# Patient Record
Sex: MALE | Race: WHITE | URBAN - METROPOLITAN AREA
[De-identification: names, ages, dates, MRNs, and addresses within clinical notes are randomized per-mention and may not be internally consistent; named-entity substitution may affect disease eponyms.]

---

## 2017-07-13 ENCOUNTER — OFFICE VISIT (OUTPATIENT)
Dept: FAMILY MEDICINE | Facility: CLINIC | Age: 57
End: 2017-07-13
Payer: MEDICARE

## 2017-07-13 VITALS
HEART RATE: 60 BPM | DIASTOLIC BLOOD PRESSURE: 64 MMHG | SYSTOLIC BLOOD PRESSURE: 110 MMHG | TEMPERATURE: 97.3 F | RESPIRATION RATE: 20 BRPM | WEIGHT: 281.6 LBS

## 2017-07-13 DIAGNOSIS — Z12.11 SPECIAL SCREENING FOR MALIGNANT NEOPLASMS, COLON: Primary | ICD-10-CM

## 2017-07-13 DIAGNOSIS — M17.32 POST-TRAUMATIC OSTEOARTHRITIS OF LEFT KNEE: ICD-10-CM

## 2017-07-13 PROCEDURE — 20610 DRAIN/INJ JOINT/BURSA W/O US: CPT | Mod: LT | Performed by: PHYSICIAN ASSISTANT

## 2017-07-13 RX ORDER — ATENOLOL 50 MG/1
TABLET ORAL
Refills: 3 | COMMUNITY
Start: 2017-04-01

## 2017-07-13 RX ORDER — IBUPROFEN 800 MG/1
TABLET, FILM COATED ORAL
Refills: 0 | COMMUNITY
Start: 2016-10-01

## 2017-07-13 RX ORDER — AMITRIPTYLINE HYDROCHLORIDE 10 MG/1
10 TABLET ORAL
Refills: 5 | COMMUNITY
Start: 2017-02-28

## 2017-07-13 RX ORDER — TRIAMCINOLONE ACETONIDE 40 MG/ML
40 INJECTION, SUSPENSION INTRA-ARTICULAR; INTRAMUSCULAR ONCE
Qty: 1 ML | Refills: 0 | COMMUNITY
Start: 2017-07-13

## 2017-07-13 RX ORDER — HYDROCODONE BITARTRATE AND ACETAMINOPHEN 10; 325 MG/1; MG/1
TABLET ORAL
Refills: 0 | COMMUNITY
Start: 2017-06-29

## 2017-07-13 RX ORDER — KETOCONAZOLE 20 MG/G
CREAM TOPICAL
Refills: 0 | COMMUNITY
Start: 2016-10-18

## 2017-07-13 ASSESSMENT — ENCOUNTER SYMPTOMS
DOUBLE VISION: 0
BACK PAIN: 0
PALPITATIONS: 0
VOMITING: 0
MYALGIAS: 0
CONSTIPATION: 0
HALLUCINATIONS: 0
NEUROLOGICAL NEGATIVE: 1
SORE THROAT: 0
ABDOMINAL PAIN: 0
BLURRED VISION: 0
HEARTBURN: 0
NERVOUS/ANXIOUS: 0
DYSURIA: 0
TINGLING: 0
COUGH: 0
DIAPHORESIS: 0
EYE PAIN: 0
HEMOPTYSIS: 0
EYE REDNESS: 0
BLOOD IN STOOL: 0
NAUSEA: 0
INSOMNIA: 0
FOCAL WEAKNESS: 0
FREQUENCY: 0
PHOTOPHOBIA: 0
FEVER: 0
LOSS OF CONSCIOUSNESS: 0
SHORTNESS OF BREATH: 0
DEPRESSION: 0
EYE DISCHARGE: 0
SPUTUM PRODUCTION: 0
ORTHOPNEA: 0
SEIZURES: 0
NECK PAIN: 0
WEIGHT LOSS: 0
WHEEZING: 0
SENSORY CHANGE: 0
HEADACHES: 0
DIARRHEA: 0
WEAKNESS: 0
DIZZINESS: 0

## 2017-07-13 ASSESSMENT — LIFESTYLE VARIABLES: SUBSTANCE_ABUSE: 0

## 2017-07-13 ASSESSMENT — PAIN SCALES - GENERAL: PAINLEVEL: WORST PAIN (10)

## 2017-07-13 NOTE — MR AVS SNAPSHOT
After Visit Summary   7/13/2017    Vic Sutherland    MRN: [id number]           Patient Information     Date Of Birth          1960        Visit Information        Provider Department      7/13/2017 2:40 PM Kane Ewing PA-C [facility]        Today's Diagnoses     Special screening for malignant neoplasms, colon    -  1    Post-traumatic osteoarthritis of left knee           Follow-ups after your visit        Additional Services     GASTROENTEROLOGY ADULT REF PROCEDURE ONLY       Last Lab Result: No results found for: CR  There is no height or weight on file to calculate BMI.     Needed:  No  Language:  English    Patient will be contacted to schedule procedure.     Please be aware that coverage of these services is subject to the terms and limitations of your health insurance plan.  Call member services at your health plan with any benefit or coverage questions.  Any procedures must be performed at a [place] facility OR coordinated by your clinic's referral office.    Please bring the following with you to your appointment:    (1) Any X-Rays, CTs or MRIs which have been performed.  Contact the facility where they were done to arrange for  prior to your scheduled appointment.    (2) List of current medications   (3) This referral request   (4) Any documents/labs given to you for this referral                  Follow-up notes from your care team     Return if symptoms worsen or fail to improve.      Who to contact     If you have questions or need follow up information about today's clinic visit or your schedule please contact [facility] directly at [phone].  Normal or non-critical lab and imaging results will be communicated to you by MyChart, letter or phone within 4 business days after the clinic has received the results. If you do not hear from us within 7 days, please contact the clinic through MyChart or phone. If you have a  "critical or abnormal lab result, we will notify you by phone as soon as possible.  Submit refill requests through [employer] or call your pharmacy and they will forward the refill request to us. Please allow 3 business days for your refill to be completed.          Additional Information About Your Visit        [employer] Information     LearnUpon lets you send messages to your doctor, view your test results, renew your prescriptions, schedule appointments and more. To sign up, go to [email or web address] . Click on \"Log in\" on the left side of the screen, which will take you to the Welcome page. Then click on \"Sign up Now\" on the right side of the page.     You will be asked to enter the access code listed below, as well as some personal information. Please follow the directions to create your username and password.     Your access code is: [email or web address]  Expires: 10/11/2017  4:06 PM     Your access code will  in 90 days. If you need help or a new code, please call your [place] clinic or [phone].        Care EveryWhere ID     This is your Care EveryWhere ID. This could be used by other organizations to access your [place] medical records  [id number]        Your Vitals Were     Pulse Temperature Respirations             60 97.3  F (36.3  C) (Tympanic) 20          Blood Pressure from Last 3 Encounters:   17 110/64   16 119/76    Weight from Last 3 Encounters:   17 281 lb 9.6 oz (127.7 kg)   16 283 lb 3.2 oz (128.5 kg)              We Performed the Following     DRAIN/INJECT LARGE JOINT/BURSA     GASTROENTEROLOGY ADULT REF PROCEDURE ONLY     KENALOG PER 10 MG        Primary Care Provider    None Specified       No primary provider on file.        Equal Access to Services     [facility] DANNI : Morales Gunter, jeannie knutson, bro branch. So [facility] [phone].    ATENCIÓN: Si habla español, tiene a baker disposición " servicios gratuitos de asistencia lingüística. Richa mcdonald [phone].    We comply with applicable federal civil rights laws and [place] laws. We do not discriminate on the basis of race, color, national origin, age, disability sex, sexual orientation or gender identity.            Thank you!     Thank you for choosing [facility]  for your care. Our goal is always to provide you with excellent care. Hearing back from our patients is one way we can continue to improve our services. Please take a few minutes to complete the written survey that you may receive in the mail after your visit with us. Thank you!             Your Updated Medication List - Protect others around you: Learn how to safely use, store and throw away your medicines at www.disposemymeds.org.          This list is accurate as of: 7/13/17  4:06 PM.  Always use your most recent med list.                   Brand Name Dispense Instructions for use Diagnosis    amitriptyline 10 MG tablet    ELAVIL     Take 10 mg by mouth        amLODIPine 10 MG tablet    NORVASC     TK 1 T PO QD        ASPIRIN PO      Take 81 mg by mouth        atenolol 50 MG tablet    TENORMIN     TK 1 T PO QD        GLEEVEC PO      Take by mouth daily        HYDROcodone-acetaminophen  MG per tablet    NORCO          ibuprofen 800 MG tablet    ADVIL/MOTRIN     TK 1 T PO TID WF        ketoconazole 2 % cream    NIZORAL          POTASSIUM PO           simvastatin 40 MG tablet    ZOCOR     TK 1 T PO QPM        triamcinolone acetonide 40 MG/ML injection    KENALOG-40    1 mL    1 mL (40 mg) by INTRA-ARTICULAR route once for 1 dose

## 2017-07-13 NOTE — PROGRESS NOTES
HPI    SUBJECTIVE:                                                    Vic Sutherland is a 56 year old male who presents to clinic today for a long history of knee pain and is requesting an injection. He has had injections in the past and it's been just over a year since his last injection.    Musculoskeletal problem/pain      Duration: Couple months     Description  Location: Left knee     Intensity:  severe    Accompanying signs and symptoms: Possible swelling     History  Previous similar problem: no   Previous evaluation:  none    Precipitating or alleviating factors:  Trauma or overuse: YES  Aggravating factors include: walking    Therapies tried and outcome: nothing    Problem list and histories reviewed & adjusted, as indicated.  Additional history: as documented    There is no problem list on file for this patient.    History reviewed. No pertinent surgical history.    Social History   Substance Use Topics     Smoking status: Former Smoker     Types: Cigarettes     Smokeless tobacco: Not on file     Alcohol use No     History reviewed. No pertinent family history.      Current Outpatient Prescriptions   Medication Sig Dispense Refill     amitriptyline (ELAVIL) 10 MG tablet Take 10 mg by mouth  5     atenolol (TENORMIN) 50 MG tablet TK 1 T PO QD  3     HYDROcodone-acetaminophen (NORCO)  MG per tablet   0     ibuprofen (ADVIL/MOTRIN) 800 MG tablet TK 1 T PO TID WF  0     ketoconazole (NIZORAL) 2 % cream   0     POTASSIUM PO        ASPIRIN PO Take 81 mg by mouth       triamcinolone acetonide (KENALOG-40) 40 MG/ML injection 1 mL (40 mg) by INTRA-ARTICULAR route once for 1 dose 1 mL 0     Imatinib Mesylate (GLEEVEC PO) Take by mouth daily       amLODIPine (NORVASC) 10 MG tablet TK 1 T PO QD  3     simvastatin (ZOCOR) 40 MG tablet TK 1 T PO QPM  1     No Known Allergies  Labs reviewed in EPIC    Reviewed and updated as needed this visit by clinical staff       Reviewed and updated as needed this visit by  Provider       Review of Systems   Constitutional: Negative for diaphoresis, fever, malaise/fatigue and weight loss.   HENT: Negative for congestion, ear discharge, ear pain, hearing loss, nosebleeds and sore throat.    Eyes: Negative for blurred vision, double vision, photophobia, pain, discharge and redness.   Respiratory: Negative for cough, hemoptysis, sputum production, shortness of breath and wheezing.    Cardiovascular: Negative for chest pain, palpitations, orthopnea and leg swelling.   Gastrointestinal: Negative for abdominal pain, blood in stool, constipation, diarrhea, heartburn, melena, nausea and vomiting.   Genitourinary: Negative.  Negative for dysuria, frequency and urgency.   Musculoskeletal: Positive for joint pain. Negative for back pain, myalgias and neck pain.   Skin: Negative for itching and rash.   Neurological: Negative.  Negative for dizziness, tingling, sensory change, focal weakness, seizures, loss of consciousness, weakness and headaches.   Endo/Heme/Allergies: Negative.    Psychiatric/Behavioral: Negative for depression, hallucinations, substance abuse and suicidal ideas. The patient is not nervous/anxious and does not have insomnia.          Physical Exam   Constitutional: He is oriented to person, place, and time and well-developed, well-nourished, and in no distress.   HENT:   Head: Normocephalic and atraumatic.   Right Ear: External ear normal.   Left Ear: External ear normal.   Nose: Nose normal.   Mouth/Throat: Oropharynx is clear and moist.   Eyes: Conjunctivae and EOM are normal. Pupils are equal, round, and reactive to light. Right eye exhibits no discharge. Left eye exhibits no discharge. No scleral icterus.   Neck: Normal range of motion. Neck supple. No thyromegaly present.   Cardiovascular: Normal rate, regular rhythm, normal heart sounds and intact distal pulses.  Exam reveals no gallop and no friction rub.    No murmur heard.  Pulmonary/Chest: Effort normal and breath  sounds normal. No respiratory distress. He has no wheezes. He has no rales. He exhibits no tenderness.   Abdominal: Soft. Bowel sounds are normal. He exhibits no distension and no mass. There is no tenderness. There is no rebound and no guarding.   Musculoskeletal: He exhibits no edema.        Left knee: He exhibits decreased range of motion. Tenderness found. Medial joint line tenderness noted.   Lymphadenopathy:     He has no cervical adenopathy.   Neurological: He is alert and oriented to person, place, and time. He has normal reflexes. No cranial nerve deficit. He exhibits normal muscle tone. Gait normal. Coordination normal.   Skin: Skin is warm and dry. No rash noted. No erythema.   Psychiatric: Mood, memory, affect and judgment normal.       (Z12.11) Special screening for malignant neoplasms, colon  (primary encounter diagnosis)  Comment:   Plan: GASTROENTEROLOGY ADULT REF PROCEDURE ONLY            (M17.32) Post-traumatic osteoarthritis of left knee  Comment:   Plan: DRAIN/INJECT LARGE JOINT/BURSA, KENALOG PER 10         MG          Procedure note: After prep with rubbing alcohol ablation of the milligrams of triamcinolone combined with 7 cc of lidocaine were instilled in the left knee using a medial patellar approach.      Follow-up as needed.

## 2017-07-13 NOTE — NURSING NOTE
Chief Complaint   Patient presents with     Knee Pain       Initial /64 (BP Location: Right arm, Patient Position: Chair, Cuff Size: Adult Large)  Pulse 60  Temp 97.3  F (36.3  C) (Tympanic)  Resp 20  Wt 281 lb 9.6 oz (127.7 kg) There is no height or weight on file to calculate BMI.  Medication Reconciliation: complete    Health Maintenance that is potentially due pending provider review:  Colonoscopy/FIT    Gave pt phone number/pended order to schedule mammo and/or colonoscopy(or FIT)

## 2025-04-09 ENCOUNTER — LAB (OUTPATIENT)
Dept: LAB | Facility: CLINIC | Age: 65
End: 2025-04-09
Payer: MEDICARE

## 2025-04-09 DIAGNOSIS — M25.559 PAIN IN HIP JOINT: Primary | ICD-10-CM

## 2025-04-09 LAB
CRP SERPL-MCNC: 9.69 MG/L
ERYTHROCYTE [SEDIMENTATION RATE] IN BLOOD BY WESTERGREN METHOD: 14 MM/HR (ref 0–20)
Lab: NORMAL
PERFORMING LABORATORY: NORMAL
SPECIMEN STATUS: NORMAL
TEST NAME: NORMAL

## 2025-04-14 ENCOUNTER — TELEPHONE (OUTPATIENT)
Dept: RHEUMATOLOGY | Facility: CLINIC | Age: 65
End: 2025-04-14
Payer: MEDICARE

## 2025-04-14 ENCOUNTER — TELEPHONE (OUTPATIENT)
Dept: FAMILY MEDICINE | Facility: CLINIC | Age: 65
End: 2025-04-14
Payer: MEDICARE

## 2025-04-14 NOTE — TELEPHONE ENCOUNTER
[facility] lab called.  Results should have went to Dr Edilson Porter with [facility] as he is the ordering provider, not Dr Edilson Porter with  rheumatology.     Shirley Schmidt RN

## 2025-04-14 NOTE — TELEPHONE ENCOUNTER
----- Message from Edilson Porter sent at 4/12/2025 11:25 AM CDT -----  I do not think that this is my patient; are you able to forward laboratory results to ordering provider please?  ----- Message -----  From: Lab, Background User  Sent: 4/9/2025  11:00 AM CDT  To: Edilson Porter MD

## 2025-04-14 NOTE — TELEPHONE ENCOUNTER
General Call      Reason for Call:  [facility] calling because recent labwork results were sent to their provider Edilson Porter. However, the orders are from Edilson Porter from [facility]. Spoke to lab they are unable to correct it but we will fax the results to [facility] at number listed on order. [phone]. Faxed via right fax.     Catalina Peoples on 4/14/2025 at 2:34 PM  [phone]

## 2025-04-21 RX ORDER — WARFARIN SODIUM 5 MG/1
TABLET ORAL EVERY EVENING
COMMUNITY
Start: 2025-01-09

## 2025-04-21 RX ORDER — CYCLOBENZAPRINE HCL 10 MG
10 TABLET ORAL 3 TIMES DAILY PRN
Status: ON HOLD | COMMUNITY
End: 2025-04-29

## 2025-04-28 ENCOUNTER — ANESTHESIA EVENT (OUTPATIENT)
Dept: SURGERY | Facility: CLINIC | Age: 65
End: 2025-04-28
Payer: MEDICARE

## 2025-04-28 ASSESSMENT — ENCOUNTER SYMPTOMS: DYSRHYTHMIAS: 1

## 2025-04-28 NOTE — ANESTHESIA PREPROCEDURE EVALUATION
"Anesthesia Pre-Procedure Evaluation    Patient: Vic Sutherland   MRN: [id number] : 1960        Procedure : Procedure(s):  Conversion hip resurfacing to total hip arthroplasty          No past medical history on file.   No past surgical history on file.   No Known Allergies   Social History     Tobacco Use    Smoking status: Former     Types: Cigarettes    Smokeless tobacco: Not on file   Substance Use Topics    Alcohol use: No      Wt Readings from Last 1 Encounters:   17 127.7 kg (281 lb 9.6 oz)        Anesthesia Evaluation   Pt has had prior anesthetic. Type: MAC and General.        ROS/MED HX  ENT/Pulmonary:     (+)                tobacco use, Past use,                       Neurologic:       Cardiovascular:     (+) Dyslipidemia hypertension- -   -  - -   Taking blood thinners                     dysrhythmias, a-fib,             METS/Exercise Tolerance:     Hematologic:       Musculoskeletal:       GI/Hepatic:       Renal/Genitourinary:       Endo:     (+)               Obesity,       Psychiatric/Substance Use:       Infectious Disease:       Malignancy:       Other:            Physical Exam    Airway        Mallampati: II   TM distance: > 3 FB   Neck ROM: limited   Mouth opening: > 3 cm    Respiratory Devices and Support         Dental       (+) Minor Abnormalities - some fillings, tiny chips      Cardiovascular   cardiovascular exam normal          Pulmonary   pulmonary exam normal                OUTSIDE LABS:  CBC: No results found for: \"WBC\", \"HGB\", \"HCT\", \"PLT\"  BMP: No results found for: \"NA\", \"POTASSIUM\", \"CHLORIDE\", \"CO2\", \"BUN\", \"CR\", \"GLC\"  COAGS: No results found for: \"PTT\", \"INR\", \"FIBR\"  POC: No results found for: \"BGM\", \"HCG\", \"HCGS\"  HEPATIC: No results found for: \"ALBUMIN\", \"PROTTOTAL\", \"ALT\", \"AST\", \"GGT\", \"ALKPHOS\", \"BILITOTAL\", \"BILIDIRECT\", \"DAVID\"  OTHER:   Lab Results   Component Value Date    SED 14 2025       Anesthesia Plan    ASA Status:  3    NPO Status:  NPO " Appropriate    Anesthesia Type: Spinal.   Induction: Intravenous, Propofol.   Maintenance: Balanced.        Consents    Anesthesia Plan(s) and associated risks, benefits, and realistic alternatives discussed. Questions answered and patient/representative(s) expressed understanding.     - Discussed: Risks, Benefits and Alternatives for BOTH SEDATION and the PROCEDURE were discussed     - Discussed with:  Patient            Postoperative Care    Pain management: Multi-modal analgesia.   PONV prophylaxis: Ondansetron (or other 5HT-3), Dexamethasone or Solumedrol, Background Propofol Infusion     Comments:               BERNY Del Rosario CRNA    Clinically Significant Risk Factors Present on Admission                # Drug Induced Coagulation Defect: home medication list includes an anticoagulant medication

## 2025-04-29 ENCOUNTER — HOSPITAL ENCOUNTER (INPATIENT)
Facility: CLINIC | Age: 65
LOS: 1 days | Discharge: HOME | DRG: 467 | End: 2025-04-30
Attending: ORTHOPAEDIC SURGERY | Admitting: ORTHOPAEDIC SURGERY
Payer: MEDICARE

## 2025-04-29 ENCOUNTER — APPOINTMENT (OUTPATIENT)
Dept: GENERAL RADIOLOGY | Facility: CLINIC | Age: 65
DRG: 467 | End: 2025-04-29
Attending: ORTHOPAEDIC SURGERY
Payer: MEDICARE

## 2025-04-29 ENCOUNTER — ANESTHESIA (OUTPATIENT)
Dept: SURGERY | Facility: CLINIC | Age: 65
End: 2025-04-29
Payer: MEDICARE

## 2025-04-29 DIAGNOSIS — Z96.641 STATUS POST REVISION OF TOTAL REPLACEMENT OF RIGHT HIP: Primary | ICD-10-CM

## 2025-04-29 LAB
GLUCOSE BLDC GLUCOMTR-MCNC: 108 MG/DL (ref 70–99)
INR PPP: 1.11 (ref 0.85–1.15)
INR PPP: 1.15 (ref 0.85–1.15)
PROTHROMBIN TIME: 14.2 SECONDS (ref 11.8–14.8)

## 2025-04-29 PROCEDURE — 250N000013 HC RX MED GY IP 250 OP 250 PS 637

## 2025-04-29 PROCEDURE — 87075 CULTR BACTERIA EXCEPT BLOOD: CPT | Performed by: ORTHOPAEDIC SURGERY

## 2025-04-29 PROCEDURE — 250N000011 HC RX IP 250 OP 636: Mod: JZ

## 2025-04-29 PROCEDURE — 0SR904Z REPLACEMENT OF RIGHT HIP JOINT WITH CERAMIC ON POLYETHYLENE SYNTHETIC SUBSTITUTE, OPEN APPROACH: ICD-10-PCS | Performed by: ORTHOPAEDIC SURGERY

## 2025-04-29 PROCEDURE — 0SP90JZ REMOVAL OF SYNTHETIC SUBSTITUTE FROM RIGHT HIP JOINT, OPEN APPROACH: ICD-10-PCS | Performed by: ORTHOPAEDIC SURGERY

## 2025-04-29 PROCEDURE — 258N000003 HC RX IP 258 OP 636: Performed by: NURSE ANESTHETIST, CERTIFIED REGISTERED

## 2025-04-29 PROCEDURE — 999N000065 XR PELVIS PORT 1/2 VIEWS

## 2025-04-29 PROCEDURE — 272N000001 HC OR GENERAL SUPPLY STERILE: Performed by: ORTHOPAEDIC SURGERY

## 2025-04-29 PROCEDURE — 250N000009 HC RX 250

## 2025-04-29 PROCEDURE — 250N000011 HC RX IP 250 OP 636: Mod: JZ | Performed by: ORTHOPAEDIC SURGERY

## 2025-04-29 PROCEDURE — 250N000011 HC RX IP 250 OP 636

## 2025-04-29 PROCEDURE — 250N000009 HC RX 250: Performed by: ORTHOPAEDIC SURGERY

## 2025-04-29 PROCEDURE — C1776 JOINT DEVICE (IMPLANTABLE): HCPCS | Performed by: ORTHOPAEDIC SURGERY

## 2025-04-29 PROCEDURE — 36415 COLL VENOUS BLD VENIPUNCTURE: CPT | Performed by: ORTHOPAEDIC SURGERY

## 2025-04-29 PROCEDURE — 36415 COLL VENOUS BLD VENIPUNCTURE: CPT

## 2025-04-29 PROCEDURE — 710N000009 HC RECOVERY PHASE 1, LEVEL 1, PER MIN: Performed by: ORTHOPAEDIC SURGERY

## 2025-04-29 PROCEDURE — 85610 PROTHROMBIN TIME: CPT | Performed by: ORTHOPAEDIC SURGERY

## 2025-04-29 PROCEDURE — 120N000001 HC R&B MED SURG/OB

## 2025-04-29 PROCEDURE — 360N000078 HC SURGERY LEVEL 5, PER MIN: Performed by: ORTHOPAEDIC SURGERY

## 2025-04-29 PROCEDURE — C1713 ANCHOR/SCREW BN/BN,TIS/BN: HCPCS | Performed by: ORTHOPAEDIC SURGERY

## 2025-04-29 PROCEDURE — 250N000013 HC RX MED GY IP 250 OP 250 PS 637: Performed by: NURSE ANESTHETIST, CERTIFIED REGISTERED

## 2025-04-29 PROCEDURE — 87070 CULTURE OTHR SPECIMN AEROBIC: CPT | Performed by: ORTHOPAEDIC SURGERY

## 2025-04-29 PROCEDURE — 250N000013 HC RX MED GY IP 250 OP 250 PS 637: Performed by: ORTHOPAEDIC SURGERY

## 2025-04-29 PROCEDURE — 250N000013 HC RX MED GY IP 250 OP 250 PS 637: Performed by: STUDENT IN AN ORGANIZED HEALTH CARE EDUCATION/TRAINING PROGRAM

## 2025-04-29 PROCEDURE — 85610 PROTHROMBIN TIME: CPT

## 2025-04-29 PROCEDURE — 999N000141 HC STATISTIC PRE-PROCEDURE NURSING ASSESSMENT: Performed by: ORTHOPAEDIC SURGERY

## 2025-04-29 PROCEDURE — 370N000017 HC ANESTHESIA TECHNICAL FEE, PER MIN: Performed by: ORTHOPAEDIC SURGERY

## 2025-04-29 PROCEDURE — 258N000003 HC RX IP 258 OP 636

## 2025-04-29 DEVICE — PINNACLE CANCELLOUS BONE SCREW 6.5MM X 35MM
Type: IMPLANTABLE DEVICE | Site: HIP | Status: FUNCTIONAL
Brand: PINNACLE

## 2025-04-29 DEVICE — ACTIS DUOFIX HIP PROSTHESIS (FEMORAL STEM 12/14 TAPER CEMENTLESS SIZE 7 STD COLLAR)  CE
Type: IMPLANTABLE DEVICE | Site: HIP | Status: FUNCTIONAL
Brand: ACTIS

## 2025-04-29 DEVICE — IMPLANTABLE DEVICE: Type: IMPLANTABLE DEVICE | Site: HIP | Status: FUNCTIONAL

## 2025-04-29 DEVICE — BIOLOX DELTA TS CERAMIC FEMORAL HEAD 12/14 TAPER REVISION DIAMETER 28MM +5
Type: IMPLANTABLE DEVICE | Site: HIP | Status: FUNCTIONAL
Brand: BIOLOX DELTA

## 2025-04-29 DEVICE — PINNACLE POROCOAT ACETABULAR SHELL SECTOR II 66MM OD
Type: IMPLANTABLE DEVICE | Site: HIP | Status: FUNCTIONAL
Brand: PINNACLE POROCOAT

## 2025-04-29 RX ORDER — BUPIVACAINE HYDROCHLORIDE 5 MG/ML
INJECTION, SOLUTION PERINEURAL PRN
Status: DISCONTINUED | OUTPATIENT
Start: 2025-04-29 | End: 2025-04-29 | Stop reason: HOSPADM

## 2025-04-29 RX ORDER — ONDANSETRON 2 MG/ML
4 INJECTION INTRAMUSCULAR; INTRAVENOUS EVERY 30 MIN PRN
Status: DISCONTINUED | OUTPATIENT
Start: 2025-04-29 | End: 2025-04-29 | Stop reason: HOSPADM

## 2025-04-29 RX ORDER — DEXAMETHASONE SODIUM PHOSPHATE 4 MG/ML
INJECTION, SOLUTION INTRA-ARTICULAR; INTRALESIONAL; INTRAMUSCULAR; INTRAVENOUS; SOFT TISSUE PRN
Status: DISCONTINUED | OUTPATIENT
Start: 2025-04-29 | End: 2025-04-29

## 2025-04-29 RX ORDER — PROPOFOL 10 MG/ML
INJECTION, EMULSION INTRAVENOUS CONTINUOUS PRN
Status: DISCONTINUED | OUTPATIENT
Start: 2025-04-29 | End: 2025-04-29

## 2025-04-29 RX ORDER — SIMVASTATIN 40 MG
40 TABLET ORAL DAILY
Status: DISCONTINUED | OUTPATIENT
Start: 2025-04-30 | End: 2025-04-30 | Stop reason: HOSPADM

## 2025-04-29 RX ORDER — AMLODIPINE BESYLATE 10 MG/1
10 TABLET ORAL DAILY
Status: DISCONTINUED | OUTPATIENT
Start: 2025-04-30 | End: 2025-04-30 | Stop reason: HOSPADM

## 2025-04-29 RX ORDER — HYDROMORPHONE HYDROCHLORIDE 2 MG/1
4 TABLET ORAL EVERY 4 HOURS PRN
Status: DISCONTINUED | OUTPATIENT
Start: 2025-04-29 | End: 2025-04-30

## 2025-04-29 RX ORDER — ONDANSETRON 2 MG/ML
4 INJECTION INTRAMUSCULAR; INTRAVENOUS EVERY 30 MIN PRN
Status: CANCELLED | OUTPATIENT
Start: 2025-04-29

## 2025-04-29 RX ORDER — PROCHLORPERAZINE MALEATE 5 MG/1
5 TABLET ORAL EVERY 6 HOURS PRN
Status: DISCONTINUED | OUTPATIENT
Start: 2025-04-29 | End: 2025-04-30 | Stop reason: HOSPADM

## 2025-04-29 RX ORDER — ACETAMINOPHEN 325 MG/1
975 TABLET ORAL ONCE
Status: COMPLETED | OUTPATIENT
Start: 2025-04-29 | End: 2025-04-29

## 2025-04-29 RX ORDER — FENTANYL CITRATE 0.05 MG/ML
INJECTION, SOLUTION INTRAMUSCULAR; INTRAVENOUS PRN
Status: DISCONTINUED | OUTPATIENT
Start: 2025-04-29 | End: 2025-04-29

## 2025-04-29 RX ORDER — NALOXONE HYDROCHLORIDE 0.4 MG/ML
0.1 INJECTION, SOLUTION INTRAMUSCULAR; INTRAVENOUS; SUBCUTANEOUS
Status: DISCONTINUED | OUTPATIENT
Start: 2025-04-29 | End: 2025-04-29 | Stop reason: HOSPADM

## 2025-04-29 RX ORDER — ONDANSETRON 2 MG/ML
4 INJECTION INTRAMUSCULAR; INTRAVENOUS EVERY 6 HOURS PRN
Status: DISCONTINUED | OUTPATIENT
Start: 2025-04-29 | End: 2025-04-30 | Stop reason: HOSPADM

## 2025-04-29 RX ORDER — FAMOTIDINE 20 MG/1
20 TABLET, FILM COATED ORAL 2 TIMES DAILY
Status: DISCONTINUED | OUTPATIENT
Start: 2025-04-29 | End: 2025-04-30 | Stop reason: HOSPADM

## 2025-04-29 RX ORDER — KETAMINE HYDROCHLORIDE 10 MG/ML
INJECTION INTRAMUSCULAR; INTRAVENOUS PRN
Status: DISCONTINUED | OUTPATIENT
Start: 2025-04-29 | End: 2025-04-29

## 2025-04-29 RX ORDER — HYDROCODONE BITARTRATE AND ACETAMINOPHEN 5; 325 MG/1; MG/1
1-2 TABLET ORAL EVERY 4 HOURS PRN
Qty: 40 TABLET | Refills: 0 | Status: SHIPPED | OUTPATIENT
Start: 2025-04-29

## 2025-04-29 RX ORDER — SODIUM CHLORIDE, SODIUM LACTATE, POTASSIUM CHLORIDE, CALCIUM CHLORIDE 600; 310; 30; 20 MG/100ML; MG/100ML; MG/100ML; MG/100ML
INJECTION, SOLUTION INTRAVENOUS CONTINUOUS
Status: DISCONTINUED | OUTPATIENT
Start: 2025-04-29 | End: 2025-04-30 | Stop reason: HOSPADM

## 2025-04-29 RX ORDER — OXYCODONE HYDROCHLORIDE 5 MG/1
5 TABLET ORAL
Status: CANCELLED | OUTPATIENT
Start: 2025-04-29

## 2025-04-29 RX ORDER — FENTANYL CITRATE 50 UG/ML
50 INJECTION, SOLUTION INTRAMUSCULAR; INTRAVENOUS EVERY 5 MIN PRN
Status: DISCONTINUED | OUTPATIENT
Start: 2025-04-29 | End: 2025-04-29 | Stop reason: HOSPADM

## 2025-04-29 RX ORDER — NALOXONE HYDROCHLORIDE 0.4 MG/ML
0.4 INJECTION, SOLUTION INTRAMUSCULAR; INTRAVENOUS; SUBCUTANEOUS
Status: DISCONTINUED | OUTPATIENT
Start: 2025-04-29 | End: 2025-04-30 | Stop reason: HOSPADM

## 2025-04-29 RX ORDER — CEFAZOLIN SODIUM 2 G/50ML
2 SOLUTION INTRAVENOUS EVERY 8 HOURS
Status: COMPLETED | OUTPATIENT
Start: 2025-04-29 | End: 2025-04-30

## 2025-04-29 RX ORDER — HYDROXYZINE HYDROCHLORIDE 25 MG/1
25 TABLET, FILM COATED ORAL EVERY 6 HOURS PRN
Status: DISCONTINUED | OUTPATIENT
Start: 2025-04-29 | End: 2025-04-30

## 2025-04-29 RX ORDER — ENOXAPARIN SODIUM 100 MG/ML
40 INJECTION SUBCUTANEOUS EVERY 24 HOURS
Status: DISCONTINUED | OUTPATIENT
Start: 2025-04-29 | End: 2025-04-29

## 2025-04-29 RX ORDER — HYDROXYZINE HYDROCHLORIDE 25 MG/1
25 TABLET, FILM COATED ORAL EVERY 6 HOURS PRN
Qty: 30 TABLET | Refills: 0 | Status: SHIPPED | OUTPATIENT
Start: 2025-04-29

## 2025-04-29 RX ORDER — ONDANSETRON 4 MG/1
4 TABLET, ORALLY DISINTEGRATING ORAL EVERY 6 HOURS PRN
Status: DISCONTINUED | OUTPATIENT
Start: 2025-04-29 | End: 2025-04-30 | Stop reason: HOSPADM

## 2025-04-29 RX ORDER — CEFAZOLIN SODIUM/WATER 3 G/30 ML
3 SYRINGE (ML) INTRAVENOUS
Status: COMPLETED | OUTPATIENT
Start: 2025-04-29 | End: 2025-04-29

## 2025-04-29 RX ORDER — GLYCOPYRROLATE 0.2 MG/ML
INJECTION, SOLUTION INTRAMUSCULAR; INTRAVENOUS PRN
Status: DISCONTINUED | OUTPATIENT
Start: 2025-04-29 | End: 2025-04-29

## 2025-04-29 RX ORDER — OXYCODONE HYDROCHLORIDE 5 MG/1
10 TABLET ORAL
Status: CANCELLED | OUTPATIENT
Start: 2025-04-29

## 2025-04-29 RX ORDER — HYDROMORPHONE HCL IN WATER/PF 6 MG/30 ML
0.2 PATIENT CONTROLLED ANALGESIA SYRINGE INTRAVENOUS EVERY 5 MIN PRN
Status: DISCONTINUED | OUTPATIENT
Start: 2025-04-29 | End: 2025-04-29 | Stop reason: HOSPADM

## 2025-04-29 RX ORDER — ONDANSETRON 2 MG/ML
INJECTION INTRAMUSCULAR; INTRAVENOUS PRN
Status: DISCONTINUED | OUTPATIENT
Start: 2025-04-29 | End: 2025-04-29

## 2025-04-29 RX ORDER — KETOROLAC TROMETHAMINE 15 MG/ML
15 INJECTION, SOLUTION INTRAMUSCULAR; INTRAVENOUS EVERY 6 HOURS
Status: COMPLETED | OUTPATIENT
Start: 2025-04-29 | End: 2025-04-30

## 2025-04-29 RX ORDER — POLYETHYLENE GLYCOL 3350 17 G/17G
17 POWDER, FOR SOLUTION ORAL DAILY
Status: DISCONTINUED | OUTPATIENT
Start: 2025-04-30 | End: 2025-04-30 | Stop reason: HOSPADM

## 2025-04-29 RX ORDER — ONDANSETRON 4 MG/1
4 TABLET, ORALLY DISINTEGRATING ORAL EVERY 30 MIN PRN
Status: CANCELLED | OUTPATIENT
Start: 2025-04-29

## 2025-04-29 RX ORDER — METHOCARBAMOL 750 MG/1
750 TABLET, FILM COATED ORAL 3 TIMES DAILY PRN
Status: DISCONTINUED | OUTPATIENT
Start: 2025-04-29 | End: 2025-04-30 | Stop reason: HOSPADM

## 2025-04-29 RX ORDER — BUPIVACAINE HYDROCHLORIDE 7.5 MG/ML
INJECTION, SOLUTION INTRASPINAL
Status: COMPLETED | OUTPATIENT
Start: 2025-04-29 | End: 2025-04-29

## 2025-04-29 RX ORDER — ACETAMINOPHEN 325 MG/1
650 TABLET ORAL EVERY 4 HOURS PRN
Status: SHIPPED
Start: 2025-04-29 | End: 2025-04-30

## 2025-04-29 RX ORDER — DEXAMETHASONE SODIUM PHOSPHATE 4 MG/ML
4 INJECTION, SOLUTION INTRA-ARTICULAR; INTRALESIONAL; INTRAMUSCULAR; INTRAVENOUS; SOFT TISSUE
Status: CANCELLED | OUTPATIENT
Start: 2025-04-29

## 2025-04-29 RX ORDER — FENTANYL CITRATE 50 UG/ML
25 INJECTION, SOLUTION INTRAMUSCULAR; INTRAVENOUS EVERY 5 MIN PRN
Status: DISCONTINUED | OUTPATIENT
Start: 2025-04-29 | End: 2025-04-29 | Stop reason: HOSPADM

## 2025-04-29 RX ORDER — NALOXONE HYDROCHLORIDE 0.4 MG/ML
0.2 INJECTION, SOLUTION INTRAMUSCULAR; INTRAVENOUS; SUBCUTANEOUS
Status: DISCONTINUED | OUTPATIENT
Start: 2025-04-29 | End: 2025-04-30 | Stop reason: HOSPADM

## 2025-04-29 RX ORDER — AMOXICILLIN 250 MG
1 CAPSULE ORAL 2 TIMES DAILY
Status: DISCONTINUED | OUTPATIENT
Start: 2025-04-29 | End: 2025-04-30 | Stop reason: HOSPADM

## 2025-04-29 RX ORDER — LIDOCAINE 40 MG/G
CREAM TOPICAL
Status: DISCONTINUED | OUTPATIENT
Start: 2025-04-29 | End: 2025-04-29 | Stop reason: HOSPADM

## 2025-04-29 RX ORDER — LIDOCAINE 40 MG/G
CREAM TOPICAL
Status: DISCONTINUED | OUTPATIENT
Start: 2025-04-29 | End: 2025-04-30 | Stop reason: HOSPADM

## 2025-04-29 RX ORDER — HYDROMORPHONE HYDROCHLORIDE 1 MG/ML
0.5 INJECTION, SOLUTION INTRAMUSCULAR; INTRAVENOUS; SUBCUTANEOUS
Status: DISCONTINUED | OUTPATIENT
Start: 2025-04-29 | End: 2025-04-30 | Stop reason: HOSPADM

## 2025-04-29 RX ORDER — NALOXONE HYDROCHLORIDE 0.4 MG/ML
0.1 INJECTION, SOLUTION INTRAMUSCULAR; INTRAVENOUS; SUBCUTANEOUS
Status: CANCELLED | OUTPATIENT
Start: 2025-04-29

## 2025-04-29 RX ORDER — CARBOXYMETHYLCELLULOSE SODIUM 5 MG/ML
1 SOLUTION/ DROPS OPHTHALMIC 3 TIMES DAILY PRN
Status: DISCONTINUED | OUTPATIENT
Start: 2025-04-29 | End: 2025-04-30 | Stop reason: HOSPADM

## 2025-04-29 RX ORDER — EPINEPHRINE 1 MG/ML
INJECTION, SOLUTION, CONCENTRATE INTRAVENOUS
Status: COMPLETED | OUTPATIENT
Start: 2025-04-29 | End: 2025-04-29

## 2025-04-29 RX ORDER — HYDROMORPHONE HYDROCHLORIDE 2 MG/1
2 TABLET ORAL EVERY 4 HOURS PRN
Status: DISCONTINUED | OUTPATIENT
Start: 2025-04-29 | End: 2025-04-30

## 2025-04-29 RX ORDER — IMATINIB MESYLATE 100 MG/1
100 TABLET, FILM COATED ORAL DAILY
Status: DISCONTINUED | OUTPATIENT
Start: 2025-04-30 | End: 2025-04-30 | Stop reason: HOSPADM

## 2025-04-29 RX ORDER — HYDROMORPHONE HCL IN WATER/PF 6 MG/30 ML
0.2 PATIENT CONTROLLED ANALGESIA SYRINGE INTRAVENOUS
Status: DISCONTINUED | OUTPATIENT
Start: 2025-04-29 | End: 2025-04-30 | Stop reason: HOSPADM

## 2025-04-29 RX ORDER — CEFAZOLIN SODIUM/WATER 3 G/30 ML
3 SYRINGE (ML) INTRAVENOUS SEE ADMIN INSTRUCTIONS
Status: DISCONTINUED | OUTPATIENT
Start: 2025-04-29 | End: 2025-04-29 | Stop reason: HOSPADM

## 2025-04-29 RX ORDER — ONDANSETRON 4 MG/1
4 TABLET, ORALLY DISINTEGRATING ORAL EVERY 30 MIN PRN
Status: DISCONTINUED | OUTPATIENT
Start: 2025-04-29 | End: 2025-04-29 | Stop reason: HOSPADM

## 2025-04-29 RX ORDER — AMOXICILLIN 250 MG
1-2 CAPSULE ORAL 2 TIMES DAILY PRN
Qty: 30 TABLET | Refills: 0 | Status: SHIPPED | OUTPATIENT
Start: 2025-04-29

## 2025-04-29 RX ORDER — SODIUM CHLORIDE, SODIUM LACTATE, POTASSIUM CHLORIDE, CALCIUM CHLORIDE 600; 310; 30; 20 MG/100ML; MG/100ML; MG/100ML; MG/100ML
INJECTION, SOLUTION INTRAVENOUS CONTINUOUS
Status: DISCONTINUED | OUTPATIENT
Start: 2025-04-29 | End: 2025-04-29 | Stop reason: HOSPADM

## 2025-04-29 RX ORDER — HYDROMORPHONE HCL IN WATER/PF 6 MG/30 ML
0.4 PATIENT CONTROLLED ANALGESIA SYRINGE INTRAVENOUS EVERY 5 MIN PRN
Status: DISCONTINUED | OUTPATIENT
Start: 2025-04-29 | End: 2025-04-29 | Stop reason: HOSPADM

## 2025-04-29 RX ORDER — DEXAMETHASONE SODIUM PHOSPHATE 4 MG/ML
4 INJECTION, SOLUTION INTRA-ARTICULAR; INTRALESIONAL; INTRAMUSCULAR; INTRAVENOUS; SOFT TISSUE
Status: DISCONTINUED | OUTPATIENT
Start: 2025-04-29 | End: 2025-04-29 | Stop reason: HOSPADM

## 2025-04-29 RX ORDER — MAGNESIUM SULFATE HEPTAHYDRATE 40 MG/ML
INJECTION, SOLUTION INTRAVENOUS PRN
Status: DISCONTINUED | OUTPATIENT
Start: 2025-04-29 | End: 2025-04-29

## 2025-04-29 RX ORDER — CALCIUM CARBONATE 500 MG/1
500 TABLET, CHEWABLE ORAL 4 TIMES DAILY PRN
Status: DISCONTINUED | OUTPATIENT
Start: 2025-04-29 | End: 2025-04-30 | Stop reason: HOSPADM

## 2025-04-29 RX ORDER — WARFARIN SODIUM 5 MG/1
5 TABLET ORAL
Status: COMPLETED | OUTPATIENT
Start: 2025-04-29 | End: 2025-04-29

## 2025-04-29 RX ORDER — ACETAMINOPHEN 325 MG/1
975 TABLET ORAL EVERY 8 HOURS
Status: DISCONTINUED | OUTPATIENT
Start: 2025-04-29 | End: 2025-04-30

## 2025-04-29 RX ORDER — BISACODYL 10 MG
10 SUPPOSITORY, RECTAL RECTAL DAILY PRN
Status: DISCONTINUED | OUTPATIENT
Start: 2025-04-29 | End: 2025-04-30 | Stop reason: HOSPADM

## 2025-04-29 RX ORDER — TRANEXAMIC ACID 650 MG/1
1950 TABLET ORAL ONCE
Status: COMPLETED | OUTPATIENT
Start: 2025-04-29 | End: 2025-04-29

## 2025-04-29 RX ADMIN — HYDROXYZINE HYDROCHLORIDE 25 MG: 25 TABLET, FILM COATED ORAL at 17:41

## 2025-04-29 RX ADMIN — HYDROMORPHONE HYDROCHLORIDE 4 MG: 2 TABLET ORAL at 22:26

## 2025-04-29 RX ADMIN — MIDAZOLAM 2 MG: 1 INJECTION INTRAMUSCULAR; INTRAVENOUS at 12:27

## 2025-04-29 RX ADMIN — Medication 3 G: at 12:24

## 2025-04-29 RX ADMIN — HYDROMORPHONE HYDROCHLORIDE 0.4 MG: 0.2 INJECTION, SOLUTION INTRAMUSCULAR; INTRAVENOUS; SUBCUTANEOUS at 15:34

## 2025-04-29 RX ADMIN — PHENYLEPHRINE HYDROCHLORIDE 0.2 MCG/KG/MIN: 10 INJECTION INTRAVENOUS at 12:40

## 2025-04-29 RX ADMIN — HYDROMORPHONE HYDROCHLORIDE 2 MG: 2 TABLET ORAL at 17:40

## 2025-04-29 RX ADMIN — GLYCOPYRROLATE 0.2 MG: 0.2 INJECTION, SOLUTION INTRAMUSCULAR; INTRAVENOUS at 12:45

## 2025-04-29 RX ADMIN — METHOCARBAMOL 750 MG: 750 TABLET ORAL at 21:27

## 2025-04-29 RX ADMIN — CEFAZOLIN SODIUM 2 G: 2 SOLUTION INTRAVENOUS at 20:12

## 2025-04-29 RX ADMIN — ACETAMINOPHEN 975 MG: 325 TABLET ORAL at 10:04

## 2025-04-29 RX ADMIN — SODIUM CHLORIDE, POTASSIUM CHLORIDE, SODIUM LACTATE AND CALCIUM CHLORIDE: 600; 310; 30; 20 INJECTION, SOLUTION INTRAVENOUS at 10:57

## 2025-04-29 RX ADMIN — WARFARIN SODIUM 5 MG: 5 TABLET ORAL at 17:42

## 2025-04-29 RX ADMIN — HYDROMORPHONE HYDROCHLORIDE 2 MG: 2 TABLET ORAL at 18:15

## 2025-04-29 RX ADMIN — TRANEXAMIC ACID 1950 MG: 650 TABLET ORAL at 10:04

## 2025-04-29 RX ADMIN — DEXAMETHASONE SODIUM PHOSPHATE 4 MG: 4 INJECTION, SOLUTION INTRA-ARTICULAR; INTRALESIONAL; INTRAMUSCULAR; INTRAVENOUS; SOFT TISSUE at 12:43

## 2025-04-29 RX ADMIN — ACETAMINOPHEN 975 MG: 325 TABLET, FILM COATED ORAL at 17:41

## 2025-04-29 RX ADMIN — FAMOTIDINE 20 MG: 20 TABLET, FILM COATED ORAL at 20:12

## 2025-04-29 RX ADMIN — BUPIVACAINE HYDROCHLORIDE IN DEXTROSE 2 ML: 7.5 INJECTION, SOLUTION SUBARACHNOID at 12:39

## 2025-04-29 RX ADMIN — FENTANYL CITRATE 100 MCG: 0.05 INJECTION, SOLUTION INTRAMUSCULAR; INTRAVENOUS at 12:29

## 2025-04-29 RX ADMIN — PROPOFOL 125 MCG/KG/MIN: 10 INJECTION, EMULSION INTRAVENOUS at 12:40

## 2025-04-29 RX ADMIN — KETOROLAC TROMETHAMINE 15 MG: 15 INJECTION, SOLUTION INTRAMUSCULAR; INTRAVENOUS at 16:51

## 2025-04-29 RX ADMIN — EPINEPHRINE 0.2 MG: 1 INJECTION, SOLUTION, CONCENTRATE INTRAVENOUS at 12:39

## 2025-04-29 RX ADMIN — KETAMINE HYDROCHLORIDE 10 MG: 10 INJECTION INTRAMUSCULAR; INTRAVENOUS at 13:26

## 2025-04-29 RX ADMIN — KETAMINE HYDROCHLORIDE 20 MG: 10 INJECTION INTRAMUSCULAR; INTRAVENOUS at 12:54

## 2025-04-29 RX ADMIN — Medication 1 DROP: at 16:48

## 2025-04-29 RX ADMIN — MAGNESIUM SULFATE HEPTAHYDRATE 2 G: 40 INJECTION, SOLUTION INTRAVENOUS at 13:11

## 2025-04-29 RX ADMIN — KETOROLAC TROMETHAMINE 15 MG: 15 INJECTION, SOLUTION INTRAMUSCULAR; INTRAVENOUS at 22:45

## 2025-04-29 RX ADMIN — FENTANYL CITRATE 50 MCG: 50 INJECTION INTRAMUSCULAR; INTRAVENOUS at 15:25

## 2025-04-29 RX ADMIN — ONDANSETRON 4 MG: 2 INJECTION INTRAMUSCULAR; INTRAVENOUS at 12:43

## 2025-04-29 RX ADMIN — SENNOSIDES AND DOCUSATE SODIUM 1 TABLET: 50; 8.6 TABLET ORAL at 20:12

## 2025-04-29 RX ADMIN — FENTANYL CITRATE 50 MCG: 50 INJECTION INTRAMUSCULAR; INTRAVENOUS at 15:06

## 2025-04-29 RX ADMIN — KETAMINE HYDROCHLORIDE 20 MG: 10 INJECTION INTRAMUSCULAR; INTRAVENOUS at 13:16

## 2025-04-29 ASSESSMENT — ACTIVITIES OF DAILY LIVING (ADL)
ADLS_ACUITY_SCORE: 27
ADLS_ACUITY_SCORE: 18
ADLS_ACUITY_SCORE: 27
ADLS_ACUITY_SCORE: 27
ADLS_ACUITY_SCORE: 24
ADLS_ACUITY_SCORE: 27
ADLS_ACUITY_SCORE: 27
ADLS_ACUITY_SCORE: 24
ADLS_ACUITY_SCORE: 27
ADLS_ACUITY_SCORE: 24
ADLS_ACUITY_SCORE: 18
ADLS_ACUITY_SCORE: 18
ADLS_ACUITY_SCORE: 24

## 2025-04-29 ASSESSMENT — LIFESTYLE VARIABLES: TOBACCO_USE: 1

## 2025-04-29 NOTE — ANESTHESIA PROCEDURE NOTES
"Intrathecal injection Procedure Note    Pre-Procedure   Staff -        CRNA: Renzo Jennings APRN CRNA       Other Anesthesia Staff: Mariah Waldron       Performed By: MEREDITH       Location: OR       Procedure Start/Stop Times: 4/29/2025 12:29 PM and 4/29/2025 12:39 PM       Pre-Anesthestic Checklist: patient identified, IV checked, risks and benefits discussed, informed consent, monitors and equipment checked, pre-op evaluation, at physician/surgeon's request and post-op pain management  Timeout:       Correct Patient: Yes        Correct Procedure: Yes        Correct Site: Yes        Correct Position: Yes   Procedure Documentation  Procedure: intrathecal injection         Patient Position: sitting       Patient Prep/Sterile Barriers: sterile gloves, mask, patient draped       Skin prep: Betadine and Chloraprep       Insertion Site: L3-4. (midline approach).       Needle Gauge: 25.        Needle Length (Inches): 3.5        Spinal Needle Type: Pencan       Introducer used       # of attempts: 1 and  # of redirects:  1    Assessment/Narrative         Paresthesias: No.       CSF fluid: clear.       Opening pressure was cmH2O while  Sitting.      Medication(s) Administered   0.75% Hyperbaric Bupivacaine (Intrathecal) - Intrathecal   2 mL - 4/29/2025 12:39:00 PM  Epinephrine 1000 mcg/mL (Intrathecal) - Intrathecal   0.2 mg - 4/29/2025 12:39:00 PM  Medication Administration Time: 4/29/2025 12:29 PM      FOR [facility] ([facility] ONLY:   Pain Team Contact information: please page the Pain Team [address] [employer]. Search \"Pain\". During daytime hours, please page the attending first. At night please page the resident first.      "

## 2025-04-29 NOTE — ANESTHESIA POSTPROCEDURE EVALUATION
Patient: Vic Sutherland    Procedure: Procedure(s):  Conversion hip resurfacing to total hip arthroplasty right       Anesthesia Type:  Spinal    Note:  Disposition: Inpatient   Postop Pain Control: Uneventful            Sign Out: Well controlled pain   PONV: No   Neuro/Psych: Uneventful            Sign Out: Acceptable/Baseline neuro status   Airway/Respiratory: Uneventful            Sign Out: Acceptable/Baseline resp. status   CV/Hemodynamics: Uneventful            Sign Out: Acceptable CV status; No obvious hypovolemia; No obvious fluid overload   Other NRE: NONE   DID A NON-ROUTINE EVENT OCCUR? No           Last vitals:  Vitals Value Taken Time   /74 04/29/25 1545   Temp 36.4  C (97.6  F) 04/29/25 1545   Pulse 66 04/29/25 1557   Resp 68 04/29/25 1557   SpO2 97 % 04/29/25 1558   Vitals shown include unfiled device data.    Electronically Signed By: BERNY Amezcua CRNA  April 29, 2025  4:43 PM

## 2025-04-29 NOTE — PROVIDER NOTIFICATION
Notified post-op of right eye blurry vision. Wears glasses for reading, and vision not improved with glasses. Neuro exam non-focal. Able to see fingers in right but significantly harder than left. States the eye feels dry, but is not painful, red, itchy.     Has permanent afib on Warfarin. Has been holding Warfarin for 5 days pre-op without bridging due to CHADsVASc 2.     - Eye drops TID PRN, possibly side effect from using face mask 10 lpm intraop  - Reassess after eye drops for possible MRI/MRA    yRan Perkins PA-C

## 2025-04-29 NOTE — ANESTHESIA CARE TRANSFER NOTE
Patient: Vic Sutherland    Procedure: Procedure(s):  Conversion hip resurfacing to total hip arthroplasty right       Diagnosis: Pain of right hip [M25.551]  Diagnosis Additional Information: No value filed.    Anesthesia Type:   Spinal     Note:    Oropharynx: oropharynx clear of all foreign objects and spontaneously breathing  Level of Consciousness: drowsy  Oxygen Supplementation: face mask  Level of Supplemental Oxygen (L/min / FiO2): 10  Independent Airway: airway patency satisfactory and stable  Dentition: dentition unchanged  Vital Signs Stable: post-procedure vital signs reviewed and stable  Report to RN Given: handoff report given  Patient transferred to: PACU    Handoff Report: Identifed the Patient, Identified the Reponsible Provider, Reviewed the pertinent medical history, Discussed the surgical course, Reviewed Intra-OP anesthesia mangement and issues during anesthesia, Set expectations for post-procedure period and Allowed opportunity for questions and acknowledgement of understanding      Vitals:  Vitals Value Taken Time   /71 04/29/25 1448   Temp 37.8  C (100.1  F) 04/29/25 1448   Pulse 64 04/29/25 1454   Resp 14 04/29/25 1454   SpO2 98 % 04/29/25 1454   Vitals shown include unfiled device data.    Electronically Signed By: BERNY Amezcua CRNA  April 29, 2025  2:56 PM

## 2025-04-29 NOTE — ADDENDUM NOTE
Addendum  created 04/29/25 0669 by Renzo Jennings APRN CRNA    Clinical Note Signed, Diagnosis association updated, Intraprocedure Blocks edited

## 2025-04-29 NOTE — PHARMACY-ANTICOAGULATION SERVICE
Clinical Pharmacy - Warfarin Dosing Consult     Pharmacy has been consulted to manage this patient s warfarin therapy.  Indication: Atrial Fibrillation  Therapy Goal: INR 2-3  OP Anticoag Clinic: [facility]  Warfarin Prior to Admission: Yes  Warfarin PTA Regimen: Warfarin 7.5 mg Fridays, 5 mg all other days  Significant drug interactions: Imatinib (was taking prior to admission)  Dose Comments: Warfarin held prior to procedure. INR 1.15 today. Resume home dose of 5 mg today. Per warfarin clinic (4/28), no bridging is needed.    INR   Date Value Ref Range Status   04/29/2025 1.15 0.85 - 1.15 Final       Recommend warfarin 5 mg today.  Pharmacy will monitor Vic W Koko daily and order warfarin doses to achieve specified goal.      Please contact pharmacy as soon as possible if the warfarin needs to be held for a procedure or if the warfarin goals change.

## 2025-04-29 NOTE — PROCEDURES
4/29/2025    PREOPERATIVE DIAGNOSIS: Right hip pain s/p right hip resurfacing    POSTOPERATIVE DIAGNOSIS: Right hip pain s/p right hip resurfacing    PROCEDURE: Revision right total hip arthroplasty    SURGEON: Edilson Porter MD    ASSISTANT: Gemini Hardin PA-c.  The presence of a PA was necessary for positioning retraction, and safe progression of the case    ANESTHESIA:  Spinal    BLOOD LOSS: 300cc    COMPLICATIONS: None apparent    DISPOSITION: Stable to PACU    IMPLANTS: DePuy Actis size 7 standard offset collared femur, 28mm x +5mm ceramic femoral head, 28mm x 55mm dual mobility polyethylene femoral head, 55mm x 66mm Bimentum dual mobility liner, and 66mm Cherokee Cup with 6.5mm screw x 1    INDICATIONS: Vic is a 64 year old male who underwent bilateral hip resurfacings years ago.  He's always done well on both hips, but had developed atraumatic onset of fairly signfiicant progressive pain in his right hip.  We worked him up for both infection and metallosis but didn't see an obvious cause.  After discussing treatment options, he elected to proceed with a revision right LUCIE to improve his pain and optimize function, understanding the risks of ongoing pain, infection, damage to vessels or nerves, and risks inherent to anesthesia.    PROCEDURE: Vic was met in the preoperative holding area where the right hip  was marked.  He was then transferred to the operating theater.  After smooth induction of spinal anesthesia, he was positioned in a left lateral decubitus position with his left side.  A timeout was performed verifying the correct patient, surgery, and location.  He received preoperative antibiotics.  The right hip was then prepped and draped in a standard sterile fashion.    We then made an incision in line with a posterior approach to the hip. Dissection was sharply carried down through skin and subcutaneous tissue, and the gluteus fascia incised in line with the incision.  After incising his IT  band, there is a significant amount of serous fluid in his posterior capsule where repair had failed.  Nothing looked purulent, and the tissue quality appeared healthy.  We were able to dislocate the hip.  We then made a femoral cut, going inferior and superiorly around the central post of the [place] hip resurfacing, approximately 8 mm above the lesser troch.  We were able to remove the head fragment and the Mary Jo femoral component quite easily.    We turned our attention to the acetabulum.  We placed anterior and posterior retractors and then cleared out scar tissue from around the periphery of the cup.  We then used a Erlinda explant tool to remove the previous Mary Jo cup with minimal bone loss.  The previous cup was 62 mm.  We therefore started with a 62 mm reamer and sequentially reamed to a 66 we had a nice healthy bed of bleeding bone.  We then placed a 66 mm [place] cup with good purchase and approximately 40 degrees of abduction and 25 degrees of anteversion.  We then placed a 6.5 mm into the ilium with good purchase.  Vic has a multi level lumbar fusion and given that, as well as revision nature of his surgery, elected to place a dual mobility cup.  We therefore placed our 55 mm x 66 mm dual mobility liner and engaged the [place] taper.      We then turned our attention to the femur. After using a cookie cutter and canal finder, we broached to a size 7, keeping the stem in approximatley 10-15 deg of anteversion. At that point, we had excellent rotational stability.  We then placed a standard offset neck with and a variety of different length femoral heads.  We felt the +5mm head most appropriate which showed leg lengths felt appropriate, the hip was stable in full extension and maximal external rotation, in the sleeping position, and with flexion to 90 degrees and internal rotation to 80 degrees.     We then removed all trial components and thoroughly irrigated the wound. We placed our final  size 7 stem.  We assembled our final dual mobility head construct with a +5 mm 28 mm inner head and a 28 mm x 55 mm dual mobility outer head.  We then impacted our final head and reduced the hip. The wound was instilled with a dilute betadine solution.  There was not enough capsule to be repaired.  We then closed the gluteal fascia with gluteus fascia with 0-vicryl followed by #1 stratafix, subdermal sutures with 2-0 vicryl, and a running 3-0 subcuticular monocryl. A sterile dressing followed by an abductor pillow was placed and the patient was transferred to the PACU in stable condition.     POSTOPERATIVE PLAN:    1. WBAT right leg.  PT for mobilization   2. DVT prophylaxis: Resume preop warfarin   3. Perioperative antibiotics   4. Home in 1-2 days pending pain control, progress with therapy    MARIS HERNANDEZ MD

## 2025-04-29 NOTE — MEDICATION SCRIBE - ADMISSION MEDICATION HISTORY
Medication Scribe Admission Medication History    Admission medication history is complete. The information provided in this note is only as accurate as the sources available at the time of the update.    Information Source(s): Patient and CareEverywhere/SureScripts via in-person    Pertinent Information:   He took none of his medicines today as he didn't know if he could.  S/O states that she was out of town, so if he got a phone call stating what he could or could not have, it wasn't clear to him.  Had done on 4/28/25  INR OP 1.2 0.8 - 1.3     He has his Gleevec with him today.    Changes made to PTA medication list:  Added: None  Deleted:   Cyclobenzaprine 10 mg  Ibuprofen 800 mg  Changed: Added sig for Hydrocodone and Potassium.  Added sig for Gleevec.      Allergies reviewed with patient and updates made in EHR: yes, no allergies.    Medication History Completed By: Mariola Barclay 4/29/2025 11:01 AM    PTA Med List   Medication Sig Note Last Dose/Taking    amLODIPine (NORVASC) 10 MG tablet Take 10 mg by mouth daily.  4/28/2025 Morning    HYDROcodone-acetaminophen (NORCO)  MG per tablet Take 2 tablets by mouth every 4 hours as needed for pain.  4/28/2025 at  7:00 PM    imatinib (GLEEVEC) 100 MG tablet Take 1 tablet by mouth daily. 4/29/2025: He has this with today. 4/28/2025 at  8:00 AM    POTASSIUM GLUCONATE PO Take 500 mg by mouth daily.  4/28/2025 Morning    simvastatin (ZOCOR) 40 MG tablet Take 40 mg by mouth daily.  4/28/2025 at  8:00 AM    warfarin ANTICOAGULANT (COUMADIN) 5 MG tablet Take by mouth every evening. Take 7.5 mg every Fri; 5 mg all other days as directed by INR clinic  4/24/2025 Evening

## 2025-04-30 ENCOUNTER — APPOINTMENT (OUTPATIENT)
Dept: PHYSICAL THERAPY | Facility: CLINIC | Age: 65
DRG: 467 | End: 2025-04-30
Attending: ORTHOPAEDIC SURGERY
Payer: MEDICARE

## 2025-04-30 VITALS
TEMPERATURE: 99.8 F | OXYGEN SATURATION: 97 % | HEIGHT: 75 IN | BODY MASS INDEX: 34.94 KG/M2 | SYSTOLIC BLOOD PRESSURE: 149 MMHG | DIASTOLIC BLOOD PRESSURE: 67 MMHG | WEIGHT: 281 LBS | HEART RATE: 74 BPM | RESPIRATION RATE: 16 BRPM

## 2025-04-30 LAB
GLUCOSE BLDC GLUCOMTR-MCNC: 150 MG/DL (ref 70–99)
HGB BLD-MCNC: 11.7 G/DL (ref 13.3–17.7)
INR PPP: 1.28 (ref 0.85–1.15)
PROTHROMBIN TIME: 15.8 SECONDS (ref 11.8–14.8)

## 2025-04-30 PROCEDURE — 97110 THERAPEUTIC EXERCISES: CPT | Mod: GP

## 2025-04-30 PROCEDURE — 97161 PT EVAL LOW COMPLEX 20 MIN: CPT | Mod: GP

## 2025-04-30 PROCEDURE — 258N000003 HC RX IP 258 OP 636: Performed by: ORTHOPAEDIC SURGERY

## 2025-04-30 PROCEDURE — 36415 COLL VENOUS BLD VENIPUNCTURE: CPT | Performed by: ORTHOPAEDIC SURGERY

## 2025-04-30 PROCEDURE — 250N000013 HC RX MED GY IP 250 OP 250 PS 637: Performed by: PHYSICIAN ASSISTANT

## 2025-04-30 PROCEDURE — 97116 GAIT TRAINING THERAPY: CPT | Mod: GP

## 2025-04-30 PROCEDURE — 99232 SBSQ HOSP IP/OBS MODERATE 35: CPT

## 2025-04-30 PROCEDURE — 250N000013 HC RX MED GY IP 250 OP 250 PS 637: Performed by: STUDENT IN AN ORGANIZED HEALTH CARE EDUCATION/TRAINING PROGRAM

## 2025-04-30 PROCEDURE — 85018 HEMOGLOBIN: CPT | Performed by: ORTHOPAEDIC SURGERY

## 2025-04-30 PROCEDURE — 250N000011 HC RX IP 250 OP 636: Performed by: ORTHOPAEDIC SURGERY

## 2025-04-30 PROCEDURE — 250N000013 HC RX MED GY IP 250 OP 250 PS 637

## 2025-04-30 PROCEDURE — 999N000111 HC STATISTIC OT IP EVAL DEFER

## 2025-04-30 PROCEDURE — 250N000013 HC RX MED GY IP 250 OP 250 PS 637: Performed by: ORTHOPAEDIC SURGERY

## 2025-04-30 PROCEDURE — 85610 PROTHROMBIN TIME: CPT

## 2025-04-30 RX ORDER — ACETAMINOPHEN 325 MG/1
650 TABLET ORAL EVERY 6 HOURS PRN
Status: DISCONTINUED | OUTPATIENT
Start: 2025-04-30 | End: 2025-04-30 | Stop reason: HOSPADM

## 2025-04-30 RX ORDER — WARFARIN SODIUM 3 MG/1
6 TABLET ORAL ONCE
Status: COMPLETED | OUTPATIENT
Start: 2025-04-30 | End: 2025-04-30

## 2025-04-30 RX ORDER — HYDROCODONE BITARTRATE AND ACETAMINOPHEN 10; 325 MG/1; MG/1
1-2 TABLET ORAL EVERY 4 HOURS PRN
Status: DISCONTINUED | OUTPATIENT
Start: 2025-04-30 | End: 2025-04-30 | Stop reason: HOSPADM

## 2025-04-30 RX ORDER — METHOCARBAMOL 750 MG/1
750 TABLET, FILM COATED ORAL 3 TIMES DAILY PRN
Qty: 60 TABLET | Refills: 0 | Status: SHIPPED | OUTPATIENT
Start: 2025-04-30

## 2025-04-30 RX ORDER — WARFARIN SODIUM 3 MG/1
6 TABLET ORAL
Status: DISCONTINUED | OUTPATIENT
Start: 2025-04-30 | End: 2025-04-30 | Stop reason: DRUGHIGH

## 2025-04-30 RX ORDER — HYDROXYZINE HYDROCHLORIDE 25 MG/1
25-50 TABLET, FILM COATED ORAL EVERY 6 HOURS PRN
Status: DISCONTINUED | OUTPATIENT
Start: 2025-04-30 | End: 2025-04-30 | Stop reason: HOSPADM

## 2025-04-30 RX ADMIN — IMATINIB MESYLATE 100 MG: 100 TABLET, FILM COATED ORAL at 08:35

## 2025-04-30 RX ADMIN — FAMOTIDINE 20 MG: 20 TABLET, FILM COATED ORAL at 08:35

## 2025-04-30 RX ADMIN — HYDROCODONE BITARTRATE AND ACETAMINOPHEN 2 TABLET: 10; 325 TABLET ORAL at 09:20

## 2025-04-30 RX ADMIN — METHOCARBAMOL 750 MG: 750 TABLET ORAL at 10:56

## 2025-04-30 RX ADMIN — CEFAZOLIN SODIUM 2 G: 2 SOLUTION INTRAVENOUS at 03:50

## 2025-04-30 RX ADMIN — KETOROLAC TROMETHAMINE 15 MG: 15 INJECTION, SOLUTION INTRAMUSCULAR; INTRAVENOUS at 10:58

## 2025-04-30 RX ADMIN — SENNOSIDES AND DOCUSATE SODIUM 1 TABLET: 50; 8.6 TABLET ORAL at 08:34

## 2025-04-30 RX ADMIN — SODIUM CHLORIDE, POTASSIUM CHLORIDE, SODIUM LACTATE AND CALCIUM CHLORIDE: 600; 310; 30; 20 INJECTION, SOLUTION INTRAVENOUS at 01:52

## 2025-04-30 RX ADMIN — WARFARIN SODIUM 6 MG: 3 TABLET ORAL at 13:32

## 2025-04-30 RX ADMIN — SIMVASTATIN 40 MG: 40 TABLET, FILM COATED ORAL at 08:34

## 2025-04-30 RX ADMIN — ACETAMINOPHEN 975 MG: 325 TABLET, FILM COATED ORAL at 01:51

## 2025-04-30 RX ADMIN — AMLODIPINE BESYLATE 10 MG: 10 TABLET ORAL at 08:35

## 2025-04-30 RX ADMIN — KETOROLAC TROMETHAMINE 15 MG: 15 INJECTION, SOLUTION INTRAMUSCULAR; INTRAVENOUS at 05:25

## 2025-04-30 ASSESSMENT — ACTIVITIES OF DAILY LIVING (ADL)
ADLS_ACUITY_SCORE: 26
ADLS_ACUITY_SCORE: 26
ADLS_ACUITY_SCORE: 28
ADLS_ACUITY_SCORE: 27
ADLS_ACUITY_SCORE: 26
ADLS_ACUITY_SCORE: 28
ADLS_ACUITY_SCORE: 27
ADLS_ACUITY_SCORE: 27
ADLS_ACUITY_SCORE: 28
ADLS_ACUITY_SCORE: 26
ADLS_ACUITY_SCORE: 26
ADLS_ACUITY_SCORE: 28
ADLS_ACUITY_SCORE: 26

## 2025-04-30 NOTE — PROGRESS NOTES
"[facility] Progress Note      Post-operative Day: 1 Day Post-Op    Procedure(s):  Conversion hip resurfacing to total hip arthroplasty right  Subjective:    Pt reports ongoing pain and tenderness in the right hip. He is awaiting PT but has been getting up frequently to urinate. He doesn't think he will need outpatient physical therapy and plans on doing exercises at home.     Chest pain, SOB:  no  Nausea, vomiting:  no  Lightheadedness, dizziness:  no  Neuro:  Patient denies new onset numbness or paresthesias      Objective:  Blood pressure (!) 149/67, pulse 74, temperature 99.8  F (37.7  C), temperature source Oral, resp. rate 16, height 1.905 m (6' 3\"), weight 127.5 kg (281 lb), SpO2 97%.    Patient Vitals for the past 24 hrs:   BP Temp Temp src Pulse Resp SpO2   04/30/25 0816 (!) 149/67 99.8  F (37.7  C) Oral 74 16 97 %   04/30/25 0440 (!) 142/65 99.5  F (37.5  C) Oral 75 15 95 %   04/29/25 2334 (!) 141/65 98.4  F (36.9  C) Oral 70 16 95 %   04/29/25 1924 (!) 141/71 98.1  F (36.7  C) Oral 77 16 98 %   04/29/25 1731 (!) 143/77 -- -- 79 16 96 %   04/29/25 1730 135/77 -- -- -- -- --   04/29/25 1713 135/79 -- -- -- -- --   04/29/25 1700 130/72 -- -- -- -- --   04/29/25 1630 127/71 -- -- -- -- --   04/29/25 1628 127/68 98  F (36.7  C) Oral 67 16 96 %   04/29/25 1558 -- -- -- -- -- 97 %   04/29/25 1545 125/74 97.6  F (36.4  C) Temporal 66 24 97 %   04/29/25 1530 107/76 -- -- 64 12 97 %   04/29/25 1515 114/77 -- -- 71 24 96 %   04/29/25 1500 123/83 -- -- 73 24 92 %   04/29/25 1448 126/71 100.1  F (37.8  C) Temporal 69 10 97 %       Wt Readings from Last 4 Encounters:   04/29/25 127.5 kg (281 lb)   07/13/17 127.7 kg (281 lb 9.6 oz)   07/06/16 128.5 kg (283 lb 3.2 oz)     Gen: A&O x 3. NAD.   Wound status: Covered, Aquacel dressing is c/d/i.  Circulation, motion and sensation: Dorsiflexion/plantarflexion intact and equal bilaterally; distal lower extremity sensation is intact and equal bilaterally. Foot and " toes are warm and well perfused.    Swelling: mild  Calf tenderness: calves are soft and non-tender bilaterally     Pertinent Labs   Lab Results: personally reviewed.     Recent Labs   Lab Test 04/30/25  0514 04/29/25  1630 04/29/25  0917   INR 1.28* 1.11 1.15   HGB 11.7*  --   --        Plan:   Continue current cares and rehabilitation.  Anticoagulation protocol: Resume chronic coumadin   Pain medications:  IV Dilaudid, Norco, toradol, tylenol, vistaril, and Robaxin. Discussed with Jessica RN; home dosing of Norco 10/325mg 1-2 q 4hrs reordered. Will assess for pain control, may need to adjust for discharge pending progress.   Weight bearing status:  WBAT  Disposition: Plan for discharge to home when medically stable and pain is controlled, cleared by therapy. Likely later today vs tomorrow pending pain management.             Report completed by:  Leland Galvez PA-C  Date: 4/30/2025  Time: 10:11 AM

## 2025-04-30 NOTE — DISCHARGE INSTRUCTIONS
Your INR was 1.28 today.  You received 6 mg of warfarin prior to discharge on 4/30/25.  Begin your regular regimen of warfarin (7.5 mg on Fridays and 5 mg on all other days), tomorrow, Thursday, May 1st with your 5 mg dose. Have your INR rechecked at your scheduled appointment on Friday, May 2 nd at your anticoagulation clinic.

## 2025-04-30 NOTE — PROGRESS NOTES
[facility] Medicine Progress Note  Date of Service: 04/30/2025    Assessment & Plan   Vic Sutherland is a 64 year old male who presented on 4/29/2025 for scheduled Procedure(s):  Revision right total hip arthroplasty by Edilson Porter MD and is being followed by the hospital medicine service for co-management of acute and/or chronic perioperative medical problems.      S/p Procedure(s):  Revision right total hip arthroplasty   1 Day Post-Op    - pain control, wound cares, physical therapy, occupational therapy and DVT prophylaxis per orthopedic surgery service    Atrial fibrillation  Managed PTA with warfarin. Not on any rate controlling or rhythm controlling medication PTA.   Auscultated to be in regular rhythm POD1. Denies cardiac symptoms.   Was not bridging warfarin pre-operatively.   - Ok with ortho to resume warfarin POD1, no bridging required post op    Chronic pain syndrome  Noted in history. Patient is taking hydrocodone-acetaminophen 20-650mg Q4hrs scheduled PTA managed by PCP.   - Per ortho plan to continue home hydrocodone-acetaminophen with additional pain management for acute post-op pain as per above    Hypereosinophilic syndrome  Noted in history. Reported history of bone marrow biopsy but unclear date of this.   - Continue PTA imatinib 100mg daily     Hypertension  Mildly hypertensive POD1 in setting of acute pain & post operative stress.  - Continue PTA amlodipine 10mg daily     Hyperlipidemia  - Continue PTA simvastatin 40mg daily     DVT Prophylaxis: as per orthopedic surgery service - Warfarin  Code Status: Full Code    Lines: PIV   Hoffman catheter: no      Discussion/Disposition: Medically, the patient appears to be recovering appropriately since surgery. The patient must be seen and cleared by orthopedics, physical therapy, and occupational therapy prior to discharge.      Medically Ready for Discharge: Anticipated Today    Medical Decision  Making   Moderate complexity    25 MINUTES SPENT BY ME on the date of service doing chart review, history, exam, documentation & further activities per the note.        I have discussed, or will be discussing, the patient with hospitalist physician, Dr. Lloyd Drake.     Tasneem Alas PA-C     Interval History   Doing well this AM. Tolerating PO without nausea or emesis. Urinating without difficulty. Passing flatus. Denies chest pain, dyspnea. Ambulating without lightheadedness or dizziness. Looking forward to going home.     Physical Exam   Temp:  [97.6  F (36.4  C)-100.1  F (37.8  C)] 99.8  F (37.7  C)  Pulse:  [64-79] 74  Resp:  [10-24] 16  BP: (107-149)/(65-83) 149/67  SpO2:  [92 %-98 %] 97 %    Weights:   Vitals:    04/29/25 0859   Weight: 127.5 kg (281 lb)    Body mass index is 35.12 kg/m .    Constitutional: alert and oriented x3, laying in bed, appears comfortable, nontoxic  CV: regular rate & rhythm, no murmurs, rubs, or gallops. Radial pulses 2+.   Respiratory: CTA bilaterally, respirations unlabored on room air.   GI: Bowel sounds present throughout. Abdomen soft, nontender to palpation, nondistended.   Skin: Warm and dry. Surgical site exam deferred to orthopedics.  Musculoskeletal: Normal muscle bulk. Remainder of MSK exam deferred to orthopedics.   Neuro: distal sensation intact to lower extremities bilaterally, speech intact, interacting appropriately.     Data   Recent Labs   Lab 04/30/25  0625 04/30/25  0514 04/29/25  1630 04/29/25  1013 04/29/25  0917   HGB  --  11.7*  --   --   --    INR  --  1.28* 1.11  --  1.15   *  --   --  108*  --      Unresulted Labs Ordered in the Past 30 Days of this Admission       Date and Time Order Name Status Description    4/29/2025  1:16 PM Tissue Aerobic Bacterial Culture Routine Preliminary     4/29/2025  1:16 PM Anaerobic Bacterial Culture Routine In process              Imaging  Recent Results (from the past 24 hours)   XR Pelvis Port 1/2 Views     Narrative    EXAM: XR PELVIS PORT 1/2 VIEWS  LOCATION: [facility]  DATE: 4/29/2025    INDICATION: Status post Hip surgery  COMPARISON: 02/13/2013      Impression    IMPRESSION: Interval right hip arthroplasty with normal alignment and no evidence of acute hardware complication. Expected surrounding postoperative edema and gas. Left hip femoral head resurfacing arthroplasty with normal alignment. Lumbar spinal   fixation hardware.      I reviewed all new labs and imaging results over the last 24 hours.     Medications   Current Facility-Administered Medications   Medication Dose Route Frequency Provider Last Rate Last Admin    lactated ringers infusion   Intravenous Continuous Edilson Porter MD   Stopped at 04/30/25 0832     Current Facility-Administered Medications   Medication Dose Route Frequency Provider Last Rate Last Admin    amLODIPine (NORVASC) tablet 10 mg  10 mg Oral Daily Ryan Perkins PA-C   10 mg at 04/30/25 0835    famotidine (PEPCID) tablet 20 mg  20 mg Oral BID Edilson Porter MD   20 mg at 04/30/25 0835    Or    famotidine (PEPCID) injection 20 mg  20 mg Intravenous BID Edilson Porter MD        imatinib (GLEEVEC) tablet 100 mg  100 mg Oral Daily Ryan Perkins PA-C   100 mg at 04/30/25 0835    polyethylene glycol (MIRALAX) Packet 17 g  17 g Oral Daily Edilson Porter MD        senna-docusate (SENOKOT-S/PERICOLACE) 8.6-50 MG per tablet 1 tablet  1 tablet Oral BID Edilson Porter MD   1 tablet at 04/30/25 0834    simvastatin (ZOCOR) tablet 40 mg  40 mg Oral Daily Ryan Perkins PA-C   40 mg at 04/30/25 0834    sodium chloride (PF) 0.9% PF flush 3 mL  3 mL Intracatheter Q8H Edilson Porter MD        warfarin ANTICOAGULANT (COUMADIN) tablet 6 mg  6 mg Oral Once Edilson Porter MD        Warfarin Dose Required Daily - Pharmacist Managed  1 each Oral See Admin Instructions Ryan Perkins PA-C Catherine  ROSALIE Alas

## 2025-04-30 NOTE — PROGRESS NOTES
[facility] DISCHARGE NOTE    Patient discharged to home at 1:37 PM via wheel chair. Accompanied by spouse and staff. Discharge instructions reviewed with patient, opportunity offered to ask questions. Prescriptions filled and sent with patient upon discharge. All belongings sent with patient.    Laura Khalil RN

## 2025-04-30 NOTE — PLAN OF CARE
Goal Outcome Evaluation:      Plan of Care Reviewed With: patient    Overall Patient Progress: improvingOverall Patient Progress: improving     Patient vital signs are at baseline: Yes  Patient able to ambulate as they were prior to admission or with assist devices provided by therapies during their stay:  Yes  Patient MUST void prior to discharge:  Yes  Patient able to tolerate oral intake:  Yes  Pain has adequate pain control using Oral analgesics:  Yes  Does patient have an identified :  Yes  Has goal D/C date and time been discussed with patient:  Yes     Patient continues to set off bed alarm by getting out of bed. He is standing at bedside to void in urinal. He rolls from side to side independently.

## 2025-04-30 NOTE — PHARMACY-ANTICOAGULATION SERVICE
Clinical Pharmacy- Warfarin Discharge Note  This patient is currently on warfarin for the treatment of Atrial fibrillation.  INR Goal= 2-3  Expected length of therapy lifetime.    Warfarin PTA Regimen: 7.5 mg FRI and 5 mg ROW      Anticoagulation Dose History          Latest Ref Rng & Units 4/29/2025 4/30/2025   Recent Dosing and Labs   warfarin ANTICOAGULANT (COUMADIN) 5 MG tablet - 5 mg, $Given -   INR 0.85 - 1.15 1.11  1.15  1.28       Details          Multiple values from one day are sorted in reverse-chronological order             Your INR was 1.28 today.  You received 6 mg of warfarin prior to discharge on 4/30/25.  Begin your regular regimen of warfarin (7.5 mg on Fridays and 5 mg on all other days), tomorrow, Thursday, May 1st with your 5 mg dose. Have your INR rechecked at your scheduled appointment on Friday, May 2 nd at your anticoagulation clinic.

## 2025-04-30 NOTE — PROGRESS NOTES
"Physical Therapy: Pt has met IP PT goals, walked in hallway and completed stairs with SBA. Barrier to return home is pain management and plan for pain meds once returned home (pt/significant other with concerns as pt was taking pain meds prior to surgery). Pt should continue to walk with nursing 3-4x/day if he does not discharge today, no further IP PT needs and can continue OP PT at discharge.       04/30/25 0950   Appointment Info   Signing Clinician's Name / Credentials (PT) Olivia Saul, PT   Living Environment   People in Home significant other   Current Living Arrangements house   Home Accessibility stairs within home   Number of Stairs, Within Home, Primary greater than 10 stairs  (12-14)   Stair Railings, Within Home, Primary none  (reports he has spindles to hold on to and uses cane, significant other states she is also able to physically assist with stairs if needed, could give him a \"piggyback ride up the stairs\")   Living Environment Comments able to enter through front door where 6-7 stairs present with railing + 7 stairs up to main level, or enter through lower level without stairs but then needs to do 6-7 stairs + 7 stairs to upper level. all needs met on upper level   Self-Care   Equipment Currently Used at Home raised toilet seat;shower chair;walker, standard;cane, straight;crutches;dressing device   Fall history within last six months no   Activity/Exercise/Self-Care Comment pt typically indep with mobility, has walker, crutches, and cane. indep with ADL's/IADL's, significant other works outside of the home but reports she lined up other help to assist pt when she is gone   General Information   Onset of Illness/Injury or Date of Surgery 04/29/25   Referring Physician Edilson Porter MD   Patient/Family Therapy Goals Statement (PT) less pain   Pertinent History of Current Problem (include personal factors and/or comorbidities that impact the POC) 64 y.o. male s/p R hip resurfacing with " conversion to LUCIE, orders for WBAT and no ROM restrictions.   Existing Precautions/Restrictions no known precautions/restrictions   Weight-Bearing Status - RLE weight-bearing as tolerated  (per orders, no restrictions)   Cognition   Follows Commands (Cognition) WFL   Pain Assessment   Patient Currently in Pain Yes, see Vital Sign flowsheet  (R hip, reports pain is worse today then prior to surgery)   Range of Motion (ROM)   Range of Motion ROM deficits secondary to surgical procedure;ROM deficits secondary to pain   Strength (Manual Muscle Testing)   Strength Comments not formally assessed due to pain from recent surgery   Bed Mobility   Comment, (Bed Mobility) bed mobility not addressed in this session, significant other able to assist   Transfers   Comment, (Transfers) sit>stand from recliner with 2WW and SBA   Gait/Stairs (Locomotion)   [place] Level (Gait) supervision   Assistive Device (Gait) walker, front-wheeled   Distance in Feet (Gait) 50   Pattern (Gait) step-to   Deviations/Abnormal Patterns (Gait) antalgic;gait speed decreased;weight shifting decreased   Maintains Weight-bearing Status (Gait) able to maintain   Negotiation (Stairs) stairs independence;handrail location;number of steps;ascending technique;descending technique   [place] Level (Stairs) supervision   Handrail Location (Stairs) both sides   Number of Steps (Stairs) 5   Ascending Technique (Stairs) step-to-step   Descending Technique (Stairs) step-to-step   Balance   Balance no deficits were identified   Clinical Impression   Criteria for Skilled Therapeutic Intervention Yes, treatment indicated   PT Diagnosis (PT) impaired mobility s/p R LUCIE   Influenced by the following impairments pain, reduced ROM, LE weakness   Functional limitations due to impairments impaired gait, stairs   Clinical Presentation (PT Evaluation Complexity) stable   Clinical Presentation Rationale clinical reasoning   Clinical Decision Making (Complexity) low  complexity   Planned Therapy Interventions (PT) bed mobility training;cryotherapy;gait training;home exercise program;patient/family education;ROM (range of motion);stair training;strengthening;transfer training;progressive activity/exercise;risk factor education;home program guidelines   Risk & Benefits of therapy have been explained evaluation/treatment results reviewed;care plan/treatment goals reviewed;risks/benefits reviewed;current/potential barriers reviewed;participants voiced agreement with care plan;participants included;patient;spouse/significant other   PT Total Evaluation Time   PT Eval, Low Complexity Minutes (05953) 11   Physical Therapy Goals   PT Frequency One time eval and treatment only   PT Predicted Duration/Target Date for Goal Attainment 04/30/25   PT Goals Transfers;Gait;Stairs   PT: Transfers Supervision/stand-by assist;Bed to/from chair;Goal Met   PT: Gait Supervision/stand-by assist;Standard walker;Greater than 200 feet;Goal Met   PT: Stairs Supervision/stand-by assist;5 stairs;Rail on both sides;Goal Met   Interventions   Interventions Quick Adds Gait Training;Therapeutic Procedure   Therapeutic Procedure/Exercise   Ther. Procedure: strength, endurance, ROM, flexibillity Minutes (48731) 8   Symptoms Noted During/After Treatment increased pain  (verbally instructed pt through exercises due to pain, not completed today)   Treatment Detail/Skilled Intervention instructed through R LUCIE HEP following printed handout to progress aftercares: ankle pumps, quad sets, glute sets, hamstring sets, heel slides, SAQ, and SLR to be complete 2x/day x10 reps each in recliner chair or bed with legs straight. pt verbalized understanding of exercises.   Gait Training   Gait Training Minutes (20949) 16   Symptoms Noted During/After Treatment (Gait Training) increased pain   Treatment Detail/Skilled Intervention amb in hallway x50, x100 feet with 2WW and SBA, raised height of walker in room to fit pt and  "reduce leaning forward, step through pattern but shortened step length due to pain. edu on proper sequencing of stairs to reduce pain, good carryover, discussed using cane + support from significant other to complete stairs where railing not present, would likely benefit from railing being installed for long term use due to pain with mobility. significant other states she could give pt a \"piggy back ride\" up the stairs, anticipate he will be able to complete on his own accord. discussed could place a chair on landing between stairs if needing break between 2 split levels, significant other states chair will not fit. edu on importance of walking every 1-2 hours for stiffness and general healing. discussed rec for OP PT to progress aftercares. pt would be safe to return home from a PT perspective as long as pain continues to be managed.   Distance in Feet 50', 100'   [place] Level (Gait Training) stand-by assist   Physical Assistance Level (Gait Training) verbal cues;supervision   Weight Bearing (Gait Training) weight-bearing as tolerated   Assistive Device (Gait Training) standard walker   Gait Analysis Deviations decreased areli;decreased stride length;decreased weight-shifting ability   Impairments (Gait Analysis/Training) pain;ROM decreased;strength decreased   Stair Railings present at both sides   Physical Assist/Nonphysical Assist (Stairs) verbal cues;supervision   Level of [place] (Stairs) stand-by assist   PT Discharge Planning   PT Plan d/c IP PT, goals met   PT Discharge Recommendation (DC Rec) home with assist;home with outpatient physical therapy   PT Rationale for DC Rec rec OP PT to progress R LUCIE aftercares, pt would be safe to return home today pending pain management, ambulated in hallway and completed stairs, pt and significant other concerned about pain management at discharge   PT Brief overview of current status sit>stand with SBA, amb 100 feet x2 with 2WW and SBA, up/down 5 stairs "   PT Total Distance Amb During Session (feet) 200   PT Equipment Needed at Discharge   (has all equipment)   Physical Therapy Time and Intention   Timed Code Treatment Minutes 24   Total Session Time (sum of timed and untimed services) 35

## 2025-04-30 NOTE — PLAN OF CARE
"[facility] [facility] [place]    Patient admitted to room 2303 at approximately 1605 via cart from surgery. Patient was accompanied by significant other and transport tech.     Verbal SBAR report received from Nydia Brooks RN prior to patient arrival.     Patient transferred to bed via air cecille. Patient alert and oriented X 4; knew the month/year but not the correct day of the month. Patient's pain was \"8\" and medicated with dilaudid & hydroxyzine as needed. Admission vital signs: Blood pressure (!) 141/71, pulse 77, temperature 98.1  F (36.7  C), temperature source Oral, resp. rate 16, height 1.905 m (6' 3\"), weight 127.5 kg (281 lb), SpO2 98%. Patient was oriented to plan of care, call light, bed controls, tv, telephone, bathroom, and visiting hours.     Immediately upon arrival to med/surg room 2303 the patient reported \"my right eye is blurry.\"  Notified Ryan OWEN immediately.  Administered eye drop as ordered; patient stated \"it helped very little bit.\"  Patient is rubbing his eye; tried to discourage rubbing the eye to avoid further irritation.    Risk Assessment    The following safety risks were identified during admission: fall. Yellow risk band applied: YES.     Skin Initial Assessment    This writer admitted this patient and completed a full skin assessment and Harry score in the Adult PCS flowsheet.   Photo documentation of skin problem and/or wound competed via Optovue application (located under Media):  N/A    Appropriate interventions initiated as needed.     Secondary skin check completed by Deepti Ratliff RN.    Harry Risk Assessment  Sensory Perception: 3-->slightly limited (postop spinal)  Moisture: 4-->rarely moist  Activity: 1-->bedfast (postop spinal)  Mobility: 3-->slightly limited (postop spinal)  Nutrition: 3-->adequate  Friction and Shear: 3-->no apparent problem  Harry Score: 17  Sensory/Activity/Mobility Interventions: Encourage activity/OOB  Mattress: Standard gel/foam mattress " (IsoFlex, Atmos Air, etc.)  Bed Frame: Standard width and length  Informed Refusal Interventions: No    Education    Patient has a [place] to Observation order: No  Observation education completed and documented: N/A      Nathalei Montemayor RN    Goal Outcome Evaluation:      Plan of Care Reviewed With: patient, significant other

## 2025-04-30 NOTE — PLAN OF CARE
Patient set bed alarm off again and was found sitting in edge of bed between the side rails. He then stood at bedside and voided in urinal 200cc. SCDs were removed for safety. Patient back in bed with bed alarm on. He is laying on his left side.

## 2025-04-30 NOTE — PLAN OF CARE
Patient having pain in right hip rating it a 12/10. He is restless, moving around a lot, moving onto his side with help from girlfriend. He has set the bed alarm off by sitting on edge of bed without assistance and attempting to go to the bathroom. Using urinal for small amounts of urine. Called [facility] on call and received a phone order for robaxin for muscle spasms.            Update at 2120-- Patient has been sitting on the edge of the bed multiple times to use urinal. Has had 200cc out in total. Bladder scan for 625cc. Girlfriend at bedside and assisting as able.      Update at 2140-- Patient voided 200cc. Repeat bladder scan showed 444cc. Patient is still reporting numbness in groin area. Will monitor for now and cath is needed.

## 2025-04-30 NOTE — PLAN OF CARE
Girlfriend went home for the night. Had discussion with patient and girlfriend prior to her leaving about concerns with patient getting out of bed without calling for help. Patient assured he wouldn't. He allowed one bottom side rail to be lifted to help remind him. Entered room about 10 minutes later and patient was sitting on edge of bed again. He lowered the side rail himself. He states he thought he was at home and thought the beeping of the capno machine was his alarm. Bed alarm was on middle control due to patient moving around in the bed so much. Bed alarm is back on middle control and patient is laying in bed on left side with legs bent with two pillows between legs. One bottom rail is up again on the side of the bed that patient keeps sitting up on. Capno was turned off to help minimize disorientation. Room door will be left ajar.

## 2025-04-30 NOTE — PLAN OF CARE
Physical Therapy Discharge Summary    Reason for therapy discharge:    All goals and outcomes met, no further needs identified.    Progress towards therapy goal(s). See goals on Care Plan in [facility] electronic health record for goal details.  Goals met    Therapy recommendation(s):    Continued therapy is recommended.  Rationale/Recommendations:  Recommend continued OP PT to progress R LUCIE aftercares, barrier to discharge is pain management, no further IP PT needs. Pt should continue to walk with nursing 3-4x/day if remains hospitalized.

## 2025-05-01 LAB
BACTERIA TISS BX CULT: NORMAL
BACTERIA TISS BX CULT: NORMAL

## 2025-05-01 NOTE — DISCHARGE SUMMARY
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    Following the procedure noted above the patient was transferred to the post-op floor and started on:    Therapy:  physical therapy  Anticoagulation Plan: Resume chronic coumadin  Pain Management: norco, toradol, tylenol, vistaril, and Robaxin  Weight bearing status: Weight bearing as tolerated     The patient was followed and co-managed by the hospitalist service during the inpatient treatment course  Complications:  None  Consultations:  None     Pertinent Labs   Lab Results: personally reviewed.     Recent Labs   Lab Test 04/30/25  0514 04/29/25  1630 04/29/25  0917   INR 1.28* 1.11 1.15   HGB 11.7*  --   --           Discharge Information:  Condition at discharge: Stable  Discharge destination:  Discharged to home     Medications at discharge:  Discharge Medication List as of 4/30/2025  1:25 PM        START taking these medications    Details   HYDROcodone-acetaminophen (NORCO) 5-325 MG tablet Take 1-2 tablets by mouth every 4 hours as needed for moderate to severe pain., Disp-40 tablet, R-0, E-Prescribe      hydrOXYzine HCl (ATARAX) 25 MG tablet Take 1 tablet (25 mg) by mouth every 6 hours as needed for itching or anxiety (with pain,  moderate pain)., Disp-30 tablet, R-0, E-Prescribe      methocarbamol (ROBAXIN) 750 MG tablet Take 1 tablet (750 mg) by mouth 3 times daily as needed for muscle spasms., Disp-60 tablet, R-0, E-Prescribe      senna-docusate (SENOKOT-S/PERICOLACE) 8.6-50 MG tablet Take 1-2 tablets by mouth 2 times daily as needed for constipation. Take while on oral narcotics to prevent or treat constipation., Disp-30 tablet, R-0, E-PrescribeWhile taking narcotics           CONTINUE these medications which have NOT CHANGED    Details   amLODIPine (NORVASC) 10 MG tablet Take 10 mg by mouth daily., R-3, Historical      HYDROcodone-acetaminophen (NORCO)  MG per tablet Take 2 tablets by mouth every 4 hours., R-0, Historical      imatinib (GLEEVEC) 100 MG tablet Take 1 tablet by mouth daily., Historical      POTASSIUM GLUCONATE PO Take 500 mg by mouth daily., Historical      simvastatin (ZOCOR) 40 MG tablet Take 40 mg by mouth daily., R-1, Historical      warfarin ANTICOAGULANT (COUMADIN) 5 MG tablet Take by mouth every evening. Take 7.5 mg every Fri; 5 mg all other days as directed by INR clinic, Historical                        Follow-Up Care:  Patient should be seen in the office in 14 days by the Orthopedic Surgeon/Physician Assistant.  Call [phone] for appointment or questions.    Leland Galvez PA-C

## 2025-05-04 LAB — BACTERIA TISS BX CULT: NO GROWTH

## 2025-05-06 LAB — BACTERIA TISS BX CULT: NORMAL

## (undated) DEVICE — GOWN IMPERVIOUS SPECIALTY XLG/XLONG 32474

## (undated) DEVICE — SYR 20ML LL W/O NDL 302830

## (undated) DEVICE — SOL NACL 0.9% IRRIG 1000ML BOTTLE 07138-09

## (undated) DEVICE — ESU PENCIL SMOKE EVAC W/ROCKER SWITCH 0703-047-000

## (undated) DEVICE — DRSG AQUACEL AG 3.5X9.75" HYDROFIBER 412011

## (undated) DEVICE — PACK HIP LAKES WITH POUCH

## (undated) DEVICE — Device

## (undated) DEVICE — TAPE CLOTH ADHESIVE 3" ZONAS

## (undated) DEVICE — SUTURE ABSORBABLE VICRYL CT-B1 L36 IN BRAID VIOLET JB947H

## (undated) DEVICE — GLOVE BIOGEL PI MICRO INDICATOR UNDERGLOVE SZ 8.0 48980

## (undated) DEVICE — SU VICRYL 2-0 CT-1 36" UND J945H

## (undated) DEVICE — BONE CLEANING TIP INTERPULSE  0210-010-000

## (undated) DEVICE — SU PDO 1 STRATAFIX 36X36CM CTX TAPERPOINT SXPD2B405

## (undated) DEVICE — GLOVE BIOGEL PI MICRO INDICATOR UNDERGLOVE SZ 6.5 48965

## (undated) DEVICE — DRAPE SHEET REV FOLD 3/4 9349

## (undated) DEVICE — SUCTION IRR SYSTEM W/TIP INTERPULSE

## (undated) DEVICE — SU MONOCRYL 2-0 CT-1 36" UND Y945H

## (undated) DEVICE — SOL WATER IRRIG 1000ML BOTTLE 07139-09

## (undated) DEVICE — SOL NACL 0.9% IRRIG 3000ML BAG 07972-08

## (undated) DEVICE — SUTURE PASSER TRANSOSSEOUS HEWSON AR-1000

## (undated) DEVICE — SU STRATAFIX MONOCRYL 3-0 SPIRAL PS-2 30CM SXMP1B106

## (undated) DEVICE — PREP CHLORAPREP 26ML TINTED ORANGE  260815

## (undated) DEVICE — GLOVE BIOGEL PI MICRO SZ 6.5 48565

## (undated) DEVICE — SU ETHIBOND 5 V-37 4X30" MB66G

## (undated) DEVICE — GOWN LG DISP 9515

## (undated) DEVICE — DECANTER VIAL 2006S

## (undated) DEVICE — NDL 18GA 1.5" 305196

## (undated) DEVICE — GLOVE BIOGEL PI MICRO SZ 8.0 48580

## (undated) DEVICE — SUCTION MANIFOLD NEPTUNE 2 SYS 4 PORT 0702-020-000

## (undated) RX ORDER — BUPIVACAINE HYDROCHLORIDE 5 MG/ML
INJECTION, SOLUTION EPIDURAL; INTRACAUDAL; PERINEURAL
Status: DISPENSED
Start: 2025-04-29

## (undated) RX ORDER — TRANEXAMIC ACID 650 MG/1
TABLET ORAL
Status: DISPENSED
Start: 2025-04-29

## (undated) RX ORDER — PHENYLEPHRINE HYDROCHLORIDE 10 MG/ML
INJECTION INTRAVENOUS
Status: DISPENSED
Start: 2025-04-29

## (undated) RX ORDER — PROPOFOL 10 MG/ML
INJECTION, EMULSION INTRAVENOUS
Status: DISPENSED
Start: 2025-04-29

## (undated) RX ORDER — GLYCOPYRROLATE 0.2 MG/ML
INJECTION, SOLUTION INTRAMUSCULAR; INTRAVENOUS
Status: DISPENSED
Start: 2025-04-29

## (undated) RX ORDER — FENTANYL CITRATE 50 UG/ML
INJECTION, SOLUTION INTRAMUSCULAR; INTRAVENOUS
Status: DISPENSED
Start: 2025-04-29

## (undated) RX ORDER — ONDANSETRON 2 MG/ML
INJECTION INTRAMUSCULAR; INTRAVENOUS
Status: DISPENSED
Start: 2025-04-29

## (undated) RX ORDER — MAGNESIUM SULFATE HEPTAHYDRATE 40 MG/ML
INJECTION, SOLUTION INTRAVENOUS
Status: DISPENSED
Start: 2025-04-29

## (undated) RX ORDER — CEFAZOLIN SODIUM 1 G/3ML
INJECTION, POWDER, FOR SOLUTION INTRAMUSCULAR; INTRAVENOUS
Status: DISPENSED
Start: 2025-04-29

## (undated) RX ORDER — HYDROMORPHONE HCL IN WATER/PF 6 MG/30 ML
PATIENT CONTROLLED ANALGESIA SYRINGE INTRAVENOUS
Status: DISPENSED
Start: 2025-04-29

## (undated) RX ORDER — DEXAMETHASONE SODIUM PHOSPHATE 4 MG/ML
INJECTION, SOLUTION INTRA-ARTICULAR; INTRALESIONAL; INTRAMUSCULAR; INTRAVENOUS; SOFT TISSUE
Status: DISPENSED
Start: 2025-04-29

## (undated) RX ORDER — ACETAMINOPHEN 325 MG/1
TABLET ORAL
Status: DISPENSED
Start: 2025-04-29

## (undated) RX ORDER — FENTANYL CITRATE-0.9 % NACL/PF 10 MCG/ML
PLASTIC BAG, INJECTION (ML) INTRAVENOUS
Status: DISPENSED
Start: 2025-04-29